# Patient Record
Sex: FEMALE | Race: WHITE | NOT HISPANIC OR LATINO
[De-identification: names, ages, dates, MRNs, and addresses within clinical notes are randomized per-mention and may not be internally consistent; named-entity substitution may affect disease eponyms.]

---

## 2018-10-23 ENCOUNTER — TRANSCRIPTION ENCOUNTER (OUTPATIENT)
Age: 33
End: 2018-10-23

## 2018-11-05 ENCOUNTER — APPOINTMENT (OUTPATIENT)
Dept: DERMATOLOGY | Facility: CLINIC | Age: 33
End: 2018-11-05

## 2019-08-05 ENCOUNTER — RESULT REVIEW (OUTPATIENT)
Age: 34
End: 2019-08-05

## 2020-04-30 ENCOUNTER — TRANSCRIPTION ENCOUNTER (OUTPATIENT)
Age: 35
End: 2020-04-30

## 2020-11-24 ENCOUNTER — INPATIENT (INPATIENT)
Facility: HOSPITAL | Age: 35
LOS: 3 days | Discharge: HOME | End: 2020-11-28
Attending: HOSPITALIST | Admitting: HOSPITALIST
Payer: COMMERCIAL

## 2020-11-24 VITALS
HEIGHT: 62 IN | SYSTOLIC BLOOD PRESSURE: 98 MMHG | OXYGEN SATURATION: 100 % | DIASTOLIC BLOOD PRESSURE: 65 MMHG | TEMPERATURE: 97 F | HEART RATE: 120 BPM | RESPIRATION RATE: 16 BRPM

## 2020-11-24 LAB
ALBUMIN SERPL ELPH-MCNC: 5 G/DL — SIGNIFICANT CHANGE UP (ref 3.3–5)
ALP SERPL-CCNC: 73 U/L — SIGNIFICANT CHANGE UP (ref 40–120)
ALT FLD-CCNC: 10 U/L — SIGNIFICANT CHANGE UP (ref 4–33)
ANION GAP SERPL CALC-SCNC: 18 MMO/L — HIGH (ref 7–14)
ANISOCYTOSIS BLD QL: SLIGHT — SIGNIFICANT CHANGE UP
APPEARANCE UR: CLEAR — SIGNIFICANT CHANGE UP
AST SERPL-CCNC: 17 U/L — SIGNIFICANT CHANGE UP (ref 4–32)
BACTERIA # UR AUTO: SIGNIFICANT CHANGE UP
BASE EXCESS BLDV CALC-SCNC: -3.4 MMOL/L — SIGNIFICANT CHANGE UP
BASOPHILS # BLD AUTO: 0.09 K/UL — SIGNIFICANT CHANGE UP (ref 0–0.2)
BASOPHILS NFR BLD AUTO: 0.3 % — SIGNIFICANT CHANGE UP (ref 0–2)
BASOPHILS NFR SPEC: 0 % — SIGNIFICANT CHANGE UP (ref 0–2)
BILIRUB SERPL-MCNC: 0.5 MG/DL — SIGNIFICANT CHANGE UP (ref 0.2–1.2)
BILIRUB UR-MCNC: NEGATIVE — SIGNIFICANT CHANGE UP
BLOOD GAS VENOUS - CREATININE: 0.61 MG/DL — SIGNIFICANT CHANGE UP (ref 0.5–1.3)
BLOOD GAS VENOUS - FIO2: 21 — SIGNIFICANT CHANGE UP
BLOOD UR QL VISUAL: HIGH
BUN SERPL-MCNC: 9 MG/DL — SIGNIFICANT CHANGE UP (ref 7–23)
CALCIUM SERPL-MCNC: 10.2 MG/DL — SIGNIFICANT CHANGE UP (ref 8.4–10.5)
CHLORIDE BLDV-SCNC: 103 MMOL/L — SIGNIFICANT CHANGE UP (ref 96–108)
CHLORIDE SERPL-SCNC: 94 MMOL/L — LOW (ref 98–107)
CO2 SERPL-SCNC: 21 MMOL/L — LOW (ref 22–31)
COLOR SPEC: SIGNIFICANT CHANGE UP
CREAT SERPL-MCNC: 0.57 MG/DL — SIGNIFICANT CHANGE UP (ref 0.5–1.3)
EOSINOPHIL # BLD AUTO: 0.03 K/UL — SIGNIFICANT CHANGE UP (ref 0–0.5)
EOSINOPHIL NFR BLD AUTO: 0.1 % — SIGNIFICANT CHANGE UP (ref 0–6)
EOSINOPHIL NFR FLD: 0 % — SIGNIFICANT CHANGE UP (ref 0–6)
GAS PNL BLDV: 131 MMOL/L — LOW (ref 136–146)
GLUCOSE BLDV-MCNC: 98 MG/DL — SIGNIFICANT CHANGE UP (ref 70–99)
GLUCOSE SERPL-MCNC: 112 MG/DL — HIGH (ref 70–99)
GLUCOSE UR-MCNC: NEGATIVE — SIGNIFICANT CHANGE UP
HCG SERPL-ACNC: < 5 MIU/ML — SIGNIFICANT CHANGE UP
HCO3 BLDV-SCNC: 21 MMOL/L — SIGNIFICANT CHANGE UP (ref 20–27)
HCT VFR BLD CALC: 45.9 % — HIGH (ref 34.5–45)
HCT VFR BLDV CALC: 43.6 % — SIGNIFICANT CHANGE UP (ref 34.5–45)
HGB BLD-MCNC: 14.9 G/DL — SIGNIFICANT CHANGE UP (ref 11.5–15.5)
HGB BLDV-MCNC: 14.2 G/DL — SIGNIFICANT CHANGE UP (ref 11.5–15.5)
HYALINE CASTS # UR AUTO: NEGATIVE — SIGNIFICANT CHANGE UP
IMM GRANULOCYTES NFR BLD AUTO: 0.6 % — SIGNIFICANT CHANGE UP (ref 0–1.5)
KETONES UR-MCNC: HIGH
LACTATE BLDV-MCNC: 1.8 MMOL/L — SIGNIFICANT CHANGE UP (ref 0.5–2)
LEUKOCYTE ESTERASE UR-ACNC: NEGATIVE — SIGNIFICANT CHANGE UP
LYMPHOCYTES # BLD AUTO: 2.43 K/UL — SIGNIFICANT CHANGE UP (ref 1–3.3)
LYMPHOCYTES # BLD AUTO: 9 % — LOW (ref 13–44)
LYMPHOCYTES NFR SPEC AUTO: 5.2 % — LOW (ref 13–44)
MAGNESIUM SERPL-MCNC: 1.8 MG/DL — SIGNIFICANT CHANGE UP (ref 1.6–2.6)
MCHC RBC-ENTMCNC: 30.2 PG — SIGNIFICANT CHANGE UP (ref 27–34)
MCHC RBC-ENTMCNC: 32.5 % — SIGNIFICANT CHANGE UP (ref 32–36)
MCV RBC AUTO: 92.9 FL — SIGNIFICANT CHANGE UP (ref 80–100)
MONOCYTES # BLD AUTO: 1.65 K/UL — HIGH (ref 0–0.9)
MONOCYTES NFR BLD AUTO: 6.1 % — SIGNIFICANT CHANGE UP (ref 2–14)
MONOCYTES NFR BLD: 5.2 % — SIGNIFICANT CHANGE UP (ref 2–9)
NEUTROPHIL AB SER-ACNC: 88.7 % — HIGH (ref 43–77)
NEUTROPHILS # BLD AUTO: 22.57 K/UL — HIGH (ref 1.8–7.4)
NEUTROPHILS NFR BLD AUTO: 83.9 % — HIGH (ref 43–77)
NITRITE UR-MCNC: NEGATIVE — SIGNIFICANT CHANGE UP
NRBC # FLD: 0 K/UL — SIGNIFICANT CHANGE UP (ref 0–0)
PCO2 BLDV: 39 MMHG — LOW (ref 41–51)
PH BLDV: 7.36 PH — SIGNIFICANT CHANGE UP (ref 7.32–7.43)
PH UR: 6.5 — SIGNIFICANT CHANGE UP (ref 5–8)
PHOSPHATE SERPL-MCNC: 3.1 MG/DL — SIGNIFICANT CHANGE UP (ref 2.5–4.5)
PLATELET # BLD AUTO: 654 K/UL — HIGH (ref 150–400)
PLATELET COUNT - ESTIMATE: SIGNIFICANT CHANGE UP
PMV BLD: 9.3 FL — SIGNIFICANT CHANGE UP (ref 7–13)
PO2 BLDV: 42 MMHG — HIGH (ref 35–40)
POLYCHROMASIA BLD QL SMEAR: SLIGHT — SIGNIFICANT CHANGE UP
POTASSIUM BLDV-SCNC: 5.2 MMOL/L — HIGH (ref 3.4–4.5)
POTASSIUM SERPL-MCNC: 3.7 MMOL/L — SIGNIFICANT CHANGE UP (ref 3.5–5.3)
POTASSIUM SERPL-SCNC: 3.7 MMOL/L — SIGNIFICANT CHANGE UP (ref 3.5–5.3)
PROT SERPL-MCNC: 8.6 G/DL — HIGH (ref 6–8.3)
PROT UR-MCNC: 10 — SIGNIFICANT CHANGE UP
RBC # BLD: 4.94 M/UL — SIGNIFICANT CHANGE UP (ref 3.8–5.2)
RBC # FLD: 13.4 % — SIGNIFICANT CHANGE UP (ref 10.3–14.5)
RBC CASTS # UR COMP ASSIST: HIGH (ref 0–?)
SAO2 % BLDV: 69.8 % — SIGNIFICANT CHANGE UP (ref 60–85)
SODIUM SERPL-SCNC: 133 MMOL/L — LOW (ref 135–145)
SP GR SPEC: > 1.04 — HIGH (ref 1–1.04)
SQUAMOUS # UR AUTO: SIGNIFICANT CHANGE UP
UROBILINOGEN FLD QL: NORMAL — SIGNIFICANT CHANGE UP
VARIANT LYMPHS # BLD: 0.9 % — SIGNIFICANT CHANGE UP
WBC # BLD: 26.92 K/UL — HIGH (ref 3.8–10.5)
WBC # FLD AUTO: 26.92 K/UL — HIGH (ref 3.8–10.5)
WBC UR QL: SIGNIFICANT CHANGE UP (ref 0–?)

## 2020-11-24 PROCEDURE — 74177 CT ABD & PELVIS W/CONTRAST: CPT | Mod: 26

## 2020-11-24 RX ORDER — SODIUM CHLORIDE 9 MG/ML
1000 INJECTION INTRAMUSCULAR; INTRAVENOUS; SUBCUTANEOUS ONCE
Refills: 0 | Status: DISCONTINUED | OUTPATIENT
Start: 2020-11-24 | End: 2020-11-24

## 2020-11-24 RX ORDER — ONDANSETRON 8 MG/1
4 TABLET, FILM COATED ORAL ONCE
Refills: 0 | Status: COMPLETED | OUTPATIENT
Start: 2020-11-24 | End: 2020-11-24

## 2020-11-24 RX ORDER — SODIUM CHLORIDE 9 MG/ML
2000 INJECTION INTRAMUSCULAR; INTRAVENOUS; SUBCUTANEOUS ONCE
Refills: 0 | Status: COMPLETED | OUTPATIENT
Start: 2020-11-24 | End: 2020-11-24

## 2020-11-24 RX ORDER — FAMOTIDINE 10 MG/ML
20 INJECTION INTRAVENOUS ONCE
Refills: 0 | Status: COMPLETED | OUTPATIENT
Start: 2020-11-24 | End: 2020-11-24

## 2020-11-24 RX ORDER — PIPERACILLIN AND TAZOBACTAM 4; .5 G/20ML; G/20ML
3.38 INJECTION, POWDER, LYOPHILIZED, FOR SOLUTION INTRAVENOUS ONCE
Refills: 0 | Status: COMPLETED | OUTPATIENT
Start: 2020-11-24 | End: 2020-11-24

## 2020-11-24 RX ORDER — MORPHINE SULFATE 50 MG/1
2 CAPSULE, EXTENDED RELEASE ORAL ONCE
Refills: 0 | Status: DISCONTINUED | OUTPATIENT
Start: 2020-11-24 | End: 2020-11-24

## 2020-11-24 RX ADMIN — MORPHINE SULFATE 2 MILLIGRAM(S): 50 CAPSULE, EXTENDED RELEASE ORAL at 21:46

## 2020-11-24 RX ADMIN — PIPERACILLIN AND TAZOBACTAM 200 GRAM(S): 4; .5 INJECTION, POWDER, LYOPHILIZED, FOR SOLUTION INTRAVENOUS at 23:33

## 2020-11-24 RX ADMIN — MORPHINE SULFATE 2 MILLIGRAM(S): 50 CAPSULE, EXTENDED RELEASE ORAL at 23:00

## 2020-11-24 RX ADMIN — ONDANSETRON 4 MILLIGRAM(S): 8 TABLET, FILM COATED ORAL at 21:46

## 2020-11-24 RX ADMIN — FAMOTIDINE 20 MILLIGRAM(S): 10 INJECTION INTRAVENOUS at 21:46

## 2020-11-24 RX ADMIN — SODIUM CHLORIDE 2000 MILLILITER(S): 9 INJECTION INTRAMUSCULAR; INTRAVENOUS; SUBCUTANEOUS at 21:51

## 2020-11-24 NOTE — ED PROVIDER NOTE - OBJECTIVE STATEMENT
35F PMH endometriosis presents with complaint of abdominal pain. The patient reports lower abdominal pain localized to the right-side that began on Saturday. It is described as a stabbing right-sided pain that travels to the right flank area. The pain has no alleviating or exacerbating factors. It is not responsive to meloxicam. She has also been experiencing diarrhea. Stools are light-colored and watery. She also experiences nausea. She denies new medications such as antibiotics and only takes safyral for endometriosis. She reports a history of appendectomy and abdominal surgeries for endometriosis. She denies being sexually active, history of STDs, new foods, recent travel, sick contacts, fever, chills, chest pain, SOB, vomiting, or urinary symptoms. 35F PMH endometriosis and thrombocytosis presents with complaint of abdominal pain. The patient reports lower abdominal pain localized to the right-side that began on Saturday. It is described as a stabbing right-sided pain that travels to the right flank area. The pain has no alleviating or exacerbating factors. It is not responsive to meloxicam. She has also been experiencing diarrhea. Stools are light-colored and watery. She also experiences nausea. She denies new medications such as antibiotics and only takes safyral for endometriosis. She reports a history of appendectomy and abdominal surgeries for endometriosis. She denies being sexually active, history of STDs, new foods, recent travel, sick contacts, fever, chills, chest pain, SOB, vomiting, or urinary symptoms. Patient with history of thrombocytosis and sees a hematologist.

## 2020-11-24 NOTE — ED PROVIDER NOTE - PROGRESS NOTE DETAILS
pt pending imaging, given IV abx for wbc. pt more comfortable. endorse to night team Consulted GYN per Hospitalist's request. GYN will see the patient.

## 2020-11-24 NOTE — ED ADULT NURSE NOTE - NSIMPLEMENTINTERV_GEN_ALL_ED
Implemented All Universal Safety Interventions:  [place] to call system. Call bell, personal items and telephone within reach. Instruct patient to call for assistance. Room bathroom lighting operational. Non-slip footwear when patient is off stretcher. Physically safe environment: no spills, clutter or unnecessary equipment. Stretcher in lowest position, wheels locked, appropriate side rails in place.

## 2020-11-24 NOTE — ED PROVIDER NOTE - CLINICAL SUMMARY MEDICAL DECISION MAKING FREE TEXT BOX
35F PMH endometriosis and thrombocytosis presents with complaint of abdominal pain. Patient with right-sided abdominal pain that radiates to right flank. Will rule out GYN vs pyelonephritis. Patient with history of appendectomy, so appendicitis less likely. Will order CBC, CMP, UA, CT abdomen and pelvis with IV contrast. Cervical motion tenderness and vaginal discharge on pelvic examination, will order transvaginal ultrasound. Patient with reduced po intake and diarrhea without new medications. Will give 1L NS bolus.

## 2020-11-24 NOTE — ED PROVIDER NOTE - ATTENDING CONTRIBUTION TO CARE
Attending Statement: I have personally seen and examined this patient. I have fully participated in the care of this patient. I have reviewed all pertinent clinical information, including history physical exam, plan and the Resident's note and agree except as noted  36yo F hx of endometriosis pw abdominal pain  x 3 days. Mid non radiating constant abdominal pain. Associated with nausea, decrease po intake and lose nonbloody diarrhea. Cramping pain when have a BM. no fever/chills. no dysuria no cp or sob. Denies preg. no travel . Pt endorse 4 prior surgeries for endometriosis   Vital signs noted. nonicteric, no juandice.  regular. no work of breathing. soft tender mid abdomen by umbilicus no guarding or rebound. no cvat. pelvic dw resident   plan labs, urine, ct abdomen-pelvis, IVF, pain med, rectal temp, re assess

## 2020-11-24 NOTE — ED ADULT NURSE NOTE - OBJECTIVE STATEMENT
Received PT to room 25, c/o right lower abdominal pain radiating to right lower back and non bloody diarrhea x1day. PT presents well appearing at baseline, ambulatory. Denies chest pain, SOB, vomit, dysuria. Right 20g AC placed, labs drawn, bed in lowest position, awaiting MD orders, will continue to monitor.

## 2020-11-24 NOTE — ED PROVIDER NOTE - PHYSICAL EXAMINATION
General: NAD  Head: Normocephalic, Atraumatic  Eyes: PERRLA, EOMI, normal sclera  Throat: Moist mucous membranes  Respiratory: CTAB, normal respiratory effort, no wheezes, crackles, rales  CV: RRR, S1/S2, no murmurs, rubs or gallops  Abdominal: Soft, bowel sounds present, right-sided tenderness to palpation, voluntary guarding, no flank tenderness to palpation, ND  Extremities: No edema, 2+ DP pulses  Neurological: A&Ox4, MAEx4, non-focal  Skin: No rashes General: NAD  Head: Normocephalic, Atraumatic  Eyes: PERRLA, EOMI, normal sclera  Throat: Moist mucous membranes  Respiratory: CTAB, normal respiratory effort, no wheezes, crackles, rales  CV: RRR, S1/S2, no murmurs, rubs or gallops  Abdominal: Soft, bowel sounds present, right-sided tenderness to palpation, voluntary guarding, no flank tenderness to palpation, ND  : white cervical discharge, tenderness during bimanual examination   Extremities: No edema, 2+ DP pulses  Neurological: A&Ox4, MAEx4, non-focal  Skin: No rashes General: NAD  Head: Normocephalic, Atraumatic  Eyes: PERRLA, EOMI, normal sclera  Throat: Moist mucous membranes  Respiratory: CTAB, normal respiratory effort, no wheezes, crackles, rales  CV: RRR, S1/S2, no murmurs, rubs or gallops  Abdominal: Soft, bowel sounds present, right-sided tenderness to palpation, voluntary guarding, no flank tenderness to palpation, ND  : white cervical discharge, cervical tenderness and RLQ pain during bimanual examination   Extremities: No edema, 2+ DP pulses  Neurological: A&Ox4, MAEx4, non-focal  Skin: No rashes

## 2020-11-25 DIAGNOSIS — R10.9 UNSPECIFIED ABDOMINAL PAIN: ICD-10-CM

## 2020-11-25 DIAGNOSIS — K52.9 NONINFECTIVE GASTROENTERITIS AND COLITIS, UNSPECIFIED: ICD-10-CM

## 2020-11-25 DIAGNOSIS — N93.9 ABNORMAL UTERINE AND VAGINAL BLEEDING, UNSPECIFIED: ICD-10-CM

## 2020-11-25 DIAGNOSIS — A41.9 SEPSIS, UNSPECIFIED ORGANISM: ICD-10-CM

## 2020-11-25 LAB
ANION GAP SERPL CALC-SCNC: 11 MMO/L — SIGNIFICANT CHANGE UP (ref 7–14)
BASOPHILS # BLD AUTO: 0.1 K/UL — SIGNIFICANT CHANGE UP (ref 0–0.2)
BASOPHILS NFR BLD AUTO: 0.5 % — SIGNIFICANT CHANGE UP (ref 0–2)
BUN SERPL-MCNC: 6 MG/DL — LOW (ref 7–23)
CALCIUM SERPL-MCNC: 8.5 MG/DL — SIGNIFICANT CHANGE UP (ref 8.4–10.5)
CHLORIDE SERPL-SCNC: 99 MMOL/L — SIGNIFICANT CHANGE UP (ref 98–107)
CO2 SERPL-SCNC: 23 MMOL/L — SIGNIFICANT CHANGE UP (ref 22–31)
CREAT SERPL-MCNC: 0.51 MG/DL — SIGNIFICANT CHANGE UP (ref 0.5–1.3)
EOSINOPHIL # BLD AUTO: 0.13 K/UL — SIGNIFICANT CHANGE UP (ref 0–0.5)
EOSINOPHIL NFR BLD AUTO: 0.6 % — SIGNIFICANT CHANGE UP (ref 0–6)
GLUCOSE SERPL-MCNC: 82 MG/DL — SIGNIFICANT CHANGE UP (ref 70–99)
HCT VFR BLD CALC: 37.2 % — SIGNIFICANT CHANGE UP (ref 34.5–45)
HGB BLD-MCNC: 12.3 G/DL — SIGNIFICANT CHANGE UP (ref 11.5–15.5)
IMM GRANULOCYTES NFR BLD AUTO: 0.7 % — SIGNIFICANT CHANGE UP (ref 0–1.5)
LYMPHOCYTES # BLD AUTO: 11 % — LOW (ref 13–44)
LYMPHOCYTES # BLD AUTO: 2.43 K/UL — SIGNIFICANT CHANGE UP (ref 1–3.3)
MAGNESIUM SERPL-MCNC: 1.8 MG/DL — SIGNIFICANT CHANGE UP (ref 1.6–2.6)
MCHC RBC-ENTMCNC: 30.8 PG — SIGNIFICANT CHANGE UP (ref 27–34)
MCHC RBC-ENTMCNC: 33.1 % — SIGNIFICANT CHANGE UP (ref 32–36)
MCV RBC AUTO: 93 FL — SIGNIFICANT CHANGE UP (ref 80–100)
MONOCYTES # BLD AUTO: 1.73 K/UL — HIGH (ref 0–0.9)
MONOCYTES NFR BLD AUTO: 7.8 % — SIGNIFICANT CHANGE UP (ref 2–14)
NEUTROPHILS # BLD AUTO: 17.57 K/UL — HIGH (ref 1.8–7.4)
NEUTROPHILS NFR BLD AUTO: 79.4 % — HIGH (ref 43–77)
NRBC # FLD: 0 K/UL — SIGNIFICANT CHANGE UP (ref 0–0)
PHOSPHATE SERPL-MCNC: 2.4 MG/DL — LOW (ref 2.5–4.5)
PLATELET # BLD AUTO: 536 K/UL — HIGH (ref 150–400)
PMV BLD: 9.1 FL — SIGNIFICANT CHANGE UP (ref 7–13)
POTASSIUM SERPL-MCNC: 3.6 MMOL/L — SIGNIFICANT CHANGE UP (ref 3.5–5.3)
POTASSIUM SERPL-SCNC: 3.6 MMOL/L — SIGNIFICANT CHANGE UP (ref 3.5–5.3)
RBC # BLD: 4 M/UL — SIGNIFICANT CHANGE UP (ref 3.8–5.2)
RBC # FLD: 13.4 % — SIGNIFICANT CHANGE UP (ref 10.3–14.5)
SARS-COV-2 RNA SPEC QL NAA+PROBE: SIGNIFICANT CHANGE UP
SODIUM SERPL-SCNC: 133 MMOL/L — LOW (ref 135–145)
WBC # BLD: 22.12 K/UL — HIGH (ref 3.8–10.5)
WBC # FLD AUTO: 22.12 K/UL — HIGH (ref 3.8–10.5)

## 2020-11-25 PROCEDURE — 12345: CPT | Mod: NC

## 2020-11-25 PROCEDURE — 99284 EMERGENCY DEPT VISIT MOD MDM: CPT

## 2020-11-25 PROCEDURE — 76830 TRANSVAGINAL US NON-OB: CPT | Mod: 26

## 2020-11-25 PROCEDURE — 93010 ELECTROCARDIOGRAM REPORT: CPT

## 2020-11-25 PROCEDURE — 99223 1ST HOSP IP/OBS HIGH 75: CPT

## 2020-11-25 RX ORDER — POLYETHYLENE GLYCOL 3350 17 G/17G
17 POWDER, FOR SOLUTION ORAL ONCE
Refills: 0 | Status: COMPLETED | OUTPATIENT
Start: 2020-11-25 | End: 2020-11-25

## 2020-11-25 RX ORDER — ONDANSETRON 8 MG/1
4 TABLET, FILM COATED ORAL ONCE
Refills: 0 | Status: COMPLETED | OUTPATIENT
Start: 2020-11-25 | End: 2020-11-25

## 2020-11-25 RX ORDER — DROSPIRENONE/ETHINYL ESTRADIOL/LEVOMEFOLATE CALCIUM AND LEVOMEFOLATE CALCIUM 3-0.03(21)
1 KIT ORAL
Qty: 0 | Refills: 0 | DISCHARGE

## 2020-11-25 RX ORDER — SODIUM,POTASSIUM PHOSPHATES 278-250MG
1 POWDER IN PACKET (EA) ORAL ONCE
Refills: 0 | Status: COMPLETED | OUTPATIENT
Start: 2020-11-25 | End: 2020-11-25

## 2020-11-25 RX ORDER — MORPHINE SULFATE 50 MG/1
2 CAPSULE, EXTENDED RELEASE ORAL EVERY 4 HOURS
Refills: 0 | Status: DISCONTINUED | OUTPATIENT
Start: 2020-11-25 | End: 2020-11-26

## 2020-11-25 RX ORDER — SODIUM CHLORIDE 9 MG/ML
1000 INJECTION INTRAMUSCULAR; INTRAVENOUS; SUBCUTANEOUS
Refills: 0 | Status: DISCONTINUED | OUTPATIENT
Start: 2020-11-25 | End: 2020-11-26

## 2020-11-25 RX ORDER — MORPHINE SULFATE 50 MG/1
2 CAPSULE, EXTENDED RELEASE ORAL ONCE
Refills: 0 | Status: DISCONTINUED | OUTPATIENT
Start: 2020-11-25 | End: 2020-11-25

## 2020-11-25 RX ORDER — ONDANSETRON 8 MG/1
4 TABLET, FILM COATED ORAL EVERY 6 HOURS
Refills: 0 | Status: DISCONTINUED | OUTPATIENT
Start: 2020-11-25 | End: 2020-11-28

## 2020-11-25 RX ORDER — CIPROFLOXACIN LACTATE 400MG/40ML
400 VIAL (ML) INTRAVENOUS EVERY 12 HOURS
Refills: 0 | Status: DISCONTINUED | OUTPATIENT
Start: 2020-11-25 | End: 2020-11-26

## 2020-11-25 RX ORDER — METRONIDAZOLE 500 MG
500 TABLET ORAL EVERY 8 HOURS
Refills: 0 | Status: DISCONTINUED | OUTPATIENT
Start: 2020-11-25 | End: 2020-11-26

## 2020-11-25 RX ADMIN — Medication 1 TABLET(S): at 13:14

## 2020-11-25 RX ADMIN — Medication 100 MILLIGRAM(S): at 06:32

## 2020-11-25 RX ADMIN — MORPHINE SULFATE 2 MILLIGRAM(S): 50 CAPSULE, EXTENDED RELEASE ORAL at 20:53

## 2020-11-25 RX ADMIN — Medication 100 MILLIGRAM(S): at 21:11

## 2020-11-25 RX ADMIN — MORPHINE SULFATE 2 MILLIGRAM(S): 50 CAPSULE, EXTENDED RELEASE ORAL at 14:31

## 2020-11-25 RX ADMIN — Medication 200 MILLIGRAM(S): at 05:09

## 2020-11-25 RX ADMIN — MORPHINE SULFATE 2 MILLIGRAM(S): 50 CAPSULE, EXTENDED RELEASE ORAL at 04:20

## 2020-11-25 RX ADMIN — MORPHINE SULFATE 2 MILLIGRAM(S): 50 CAPSULE, EXTENDED RELEASE ORAL at 10:05

## 2020-11-25 RX ADMIN — MORPHINE SULFATE 2 MILLIGRAM(S): 50 CAPSULE, EXTENDED RELEASE ORAL at 21:08

## 2020-11-25 RX ADMIN — Medication 100 MILLIGRAM(S): at 13:14

## 2020-11-25 RX ADMIN — MORPHINE SULFATE 2 MILLIGRAM(S): 50 CAPSULE, EXTENDED RELEASE ORAL at 14:50

## 2020-11-25 RX ADMIN — SODIUM CHLORIDE 100 MILLILITER(S): 9 INJECTION INTRAMUSCULAR; INTRAVENOUS; SUBCUTANEOUS at 06:21

## 2020-11-25 RX ADMIN — ONDANSETRON 4 MILLIGRAM(S): 8 TABLET, FILM COATED ORAL at 10:05

## 2020-11-25 RX ADMIN — SODIUM CHLORIDE 100 MILLILITER(S): 9 INJECTION INTRAMUSCULAR; INTRAVENOUS; SUBCUTANEOUS at 16:57

## 2020-11-25 RX ADMIN — POLYETHYLENE GLYCOL 3350 17 GRAM(S): 17 POWDER, FOR SOLUTION ORAL at 16:57

## 2020-11-25 RX ADMIN — MORPHINE SULFATE 2 MILLIGRAM(S): 50 CAPSULE, EXTENDED RELEASE ORAL at 10:35

## 2020-11-25 RX ADMIN — Medication 200 MILLIGRAM(S): at 16:57

## 2020-11-25 RX ADMIN — ONDANSETRON 4 MILLIGRAM(S): 8 TABLET, FILM COATED ORAL at 21:11

## 2020-11-25 NOTE — H&P ADULT - HISTORY OF PRESENT ILLNESS
Patient is a 36 y/o F PMH endometriosis, thrombocytosis p/w stabbing, RLQ abdominal pain w/ radiation to R flank area since 11/21 w/ associated nausea, anorexia, decreased PO intake, and non-bloody diarrhea (>5 episodes per day). Reports pain is intermittent, severe, no alleviating/aggravating factors.  No recent antibiotic use, no change in medications, change in diet, travel, sick contacts.  Last colonoscopy >5 years ago as routine testing prior to endometriosis surgery.

## 2020-11-25 NOTE — H&P ADULT - NSICDXPASTMEDICALHX_GEN_ALL_CORE_FT
PAST MEDICAL HISTORY:  Low Muscle Tone     Migraine     Ovarian Cyst right     Seasonal Allergies

## 2020-11-25 NOTE — ED ADULT NURSE REASSESSMENT NOTE - NS ED NURSE REASSESS COMMENT FT1
Received report from MARIO [place]. Pt resting comfortably at this time. Resp. even and unlabored. Denies any complaints at this time. VS as noted. NAD. Will continue to monitor.

## 2020-11-25 NOTE — ED ADULT NURSE REASSESSMENT NOTE - NS ED NURSE REASSESS COMMENT FT1
Report given to MARIO Pichardo in [facility]. Resp. even and unlabored. Denies CP, SOB, HA, and dizziness. NAD. Transported to [facility].

## 2020-11-25 NOTE — H&P ADULT - NSHPPHYSICALEXAM_GEN_ALL_CORE
T(C): 36.8 (11-25-20 @ 04:22), Max: 37.3 (11-24-20 @ 22:50)  HR: 110 (11-25-20 @ 04:22) (96 - 125)  BP: 133/76 (11-25-20 @ 04:22) (98/65 - 139/80)  RR: 20 (11-25-20 @ 04:22) (16 - 20)  SpO2: 100% (11-25-20 @ 04:22) (100% - 100%)    Constitutional: NAD, well-developed, well-nourished  Ears, Nose, Mouth, and Throat: normal external ears and nose, normal hearing, moist oral mucosa  Eyes: normal conjunctiva, EOMI, PERRL  Neck: supple, no JVD  Respiratory: Clear to auscultation bilaterally. No wheezes, rales or rhonchi. Normal respiratory effort  Cardiovascular: RRR, no M/R/G, no edema, 2+ Peripheral Pulses  Gastrointestinal: soft, +TTP, no rigidity, rebound, nondistended, +BS, no hernia  Skin: warm, dry, no rash  Neurologic: sensation grossly intact, CN grossly intact, non-focal exam  Musculoskeletal: no clubbing, no cyanosis, no joint swelling  Psychiatric: AOX3, appropriate mood, affect

## 2020-11-25 NOTE — CHART NOTE - NSCHARTNOTEFT_GEN_A_CORE
R3 Chart Note    34y/o G0, LMP beginning of November, h/o endometriosis, presented to the ED overnight complaining of RLQ abdominal pain radiating to the right flank x3 days, associated with nausea, poor appetite, and multiple episodes of non-bloody diarrhea. GYN called because the patient requested to speak with someone from the team due to the ultrasound findings of 2 small simple ovarian cysts. The patient has no GYN complaints currently. She still has right flank pain, but it is well controlled with medication. She has some spotting since the pelvic exam that was performed in the ED yesterday.     Private GYN: Dr. Garcia ([facility])    OBH - G0  GYN - endometriosis status post LSC excision and fulguration, h/o ovarian cysts status post ovarian cystectomy x2,   PM/SH  - R3 GYN Chart Note    36y/o G0, LMP beginning of November, h/o endometriosis, presented to the ED overnight complaining of RLQ abdominal pain radiating to the right flank x3 days, associated with nausea, poor appetite, and multiple episodes of non-bloody diarrhea. GYN called because the patient requested to speak with someone from the team due to the ultrasound findings of 2 small simple ovarian cysts. The patient has no GYN complaints currently. She still has right flank pain, but it is well controlled with medication. She has some spotting since the pelvic exam that was performed in the ED yesterday.     Private GYN: Dr. Garcia ([facility])    OBH - G0  GYN - never been sexually active, endometriosis status post LSC excision and fulguration, h/o ovarian cysts status post ovarian cystectomy x2, h/o TOA  PM/SH  - low muscle tone, thrombocytosis, migraines  Meds - OCPs  All - NKDA    EXAM:  US TRANSVAGINAL    PROCEDURE DATE:  Nov 25 2020   INTERPRETATION:  CLINICAL INFORMATION: Pelvic pain, leukocytosis. History of endometriosis.    LMP: Few weeks ago.  COMPARISON: CT abdomen/pelvis from 11/24/2020.  TECHNIQUE:  Endovaginal pelvic sonogram only. Color and Spectral Doppler was performed.    FINDINGS:  Uterus is retroverted: 6.8 x 4.0 x 5.2 cm. 1.2 x 0.6 x 1.1 cm fibroid.  Endometrium: 5 mm.Trace fluid within the endometrial canal.  Right ovary: 1.0 x 1.9 x 1.7 cm cm. Normal arterial waveforms. 0.8 x 1.0 x 0.8 cm right ovarian simple cyst.  Left ovary: 3.4 x 2.5 x 2.6 cm.  Normal arterial waveforms. 2.6 x 2.1 x 2.2 cm left ovarian simple cyst.    Fluid: Trace free fluid around the left adnexa.    IMPRESSION:  1.2 cm uterine fibroid. Trace fluid within endometrial canal.  Small bilateral ovarian cysts.  Trace free fluid around the left adnexa.    PLAN:   -As requested by the patient, discussed the ultrasound with the patient's private Gynecologist  -Patient can calll her private GYN's office next week to provide an update'  -Defer management of colitis to the primary team    D/w Dr. Jose Philippe PGY-3

## 2020-11-25 NOTE — H&P ADULT - NSHPLABSRESULTS_GEN_ALL_CORE
11-24    133<L>  |  94<L>  |  9   ----------------------------<  112<H>  3.7   |  21<L>  |  0.57    Ca    10.2      24 Nov 2020 21:34  Phos  3.1     11-24  Mg     1.8     11-24    TPro  8.6<H>  /  Alb  5.0  /  TBili  0.5  /  DBili  x   /  AST  17  /  ALT  10  /  AlkPhos  73  11-24                        14.9   26.92 )-----------( 654      ( 24 Nov 2020 21:34 )             45.9     LIVER FUNCTIONS - ( 24 Nov 2020 21:34 )  Alb: 5.0 g/dL / Pro: 8.6 g/dL / ALK PHOS: 73 u/L / ALT: 10 u/L / AST: 17 u/L / GGT: x           Urinalysis Basic - ( 24 Nov 2020 23:22 )    Color: LIGHT YELLOW / Appearance: CLEAR / SG: > 1.040 / pH: 6.5  Gluc: NEGATIVE / Ketone: MODERATE  / Bili: NEGATIVE / Urobili: NORMAL   Blood: LARGE / Protein: 10 / Nitrite: NEGATIVE   Leuk Esterase: NEGATIVE / RBC: 26-50 / WBC 3-5   Sq Epi: FEW / Non Sq Epi: x / Bacteria: FEW

## 2020-11-25 NOTE — H&P ADULT - NSHPREVIEWOFSYSTEMS_GEN_ALL_CORE
Constitutional Symptoms: No weakness, fevers, chills, weight loss  Eyes: No visual changes, headache, eye pain, double vision  Ears, Nose, Mouth, Throat: No runny nose, sinus pain, ear pain, tinnitus, sore throat, dysphagia, odynophagia  Cardiovascular: No chest pain, palpitations, edema  Respiratory: No cough, wheezing, hemoptysis, shortness of breath  Gastrointestinal: +abdominal pain, anorexia, nausea, diarrhea, no vomiting  Genitourinary: No dysuria, frequency, hematuria  Musculoskeletal: No joint pain, joint swelling, decreased ROM  Skin: No pruritus, rashes, lesions, wounds  Neurologic:  No seizures, headache, paraesthesia, numbness, limb weakness    Positives and pertinent negatives noted and all other systems negative.

## 2020-11-25 NOTE — PROGRESS NOTE ADULT - SUBJECTIVE AND OBJECTIVE BOX
Patient is a 35y old  Female who presents with a chief complaint of diarrhea (25 Nov 2020 04:53)      SUBJECTIVE / OVERNIGHT EVENTS:    MEDICATIONS  (STANDING):  ciprofloxacin   IVPB 400 milliGRAM(s) IV Intermittent every 12 hours  metroNIDAZOLE  IVPB 500 milliGRAM(s) IV Intermittent every 8 hours  sodium chloride 0.9%. 1000 milliLiter(s) (100 mL/Hr) IV Continuous <Continuous>    MEDICATIONS  (PRN):  morphine  - Injectable 2 milliGRAM(s) IV Push every 4 hours PRN mod-severe pain  ondansetron Injectable 4 milliGRAM(s) IV Push every 6 hours PRN Nausea and/or Vomiting      Vital Signs Last 24 Hrs  T(C): 36.4 (25 Nov 2020 12:53), Max: 37.3 (24 Nov 2020 22:50)  T(F): 97.6 (25 Nov 2020 12:53), Max: 99.1 (24 Nov 2020 22:50)  HR: 91 (25 Nov 2020 14:31) (89 - 125)  BP: 128/70 (25 Nov 2020 14:31) (98/65 - 139/80)  BP(mean): --  RR: 18 (25 Nov 2020 12:53) (16 - 20)  SpO2: 100% (25 Nov 2020 12:53) (99% - 100%)  CAPILLARY BLOOD GLUCOSE        I&O's Summary      Constitutional: NAD, awake and alert  Neck: Soft and supple  Respiratory: no respiratory distress, Breath sounds are clear bilaterally. No wheezing, rales or rhonchi  Cardiovascular: S1 and S2, regular rate and rhythm, no murmurs. peripheral pulses palpable x4  Gastrointestinal: Soft, mild RLQ tenderness, nondistended. +BS  Extremities: No lower extremity edema, no calf tenderness, warm to touch  Musculoskeletal: Normal ROM, no joint swelling.  Skin: No rashes, No erythema   Psych: Alert and Oriented x3, normal mood, normal affect    LABS:                        12.3   22.12 )-----------( 536      ( 25 Nov 2020 08:00 )             37.2     11-25    133<L>  |  99  |  6<L>  ----------------------------<  82  3.6   |  23  |  0.51    Ca    8.5      25 Nov 2020 08:00  Phos  2.4     11-25  Mg     1.8     11-25    TPro  8.6<H>  /  Alb  5.0  /  TBili  0.5  /  DBili  x   /  AST  17  /  ALT  10  /  AlkPhos  73  11-24          Urinalysis Basic - ( 24 Nov 2020 23:22 )    Color: LIGHT YELLOW / Appearance: CLEAR / SG: > 1.040 / pH: 6.5  Gluc: NEGATIVE / Ketone: MODERATE  / Bili: NEGATIVE / Urobili: NORMAL   Blood: LARGE / Protein: 10 / Nitrite: NEGATIVE   Leuk Esterase: NEGATIVE / RBC: 26-50 / WBC 3-5   Sq Epi: FEW / Non Sq Epi: x / Bacteria: FEW        RADIOLOGY & ADDITIONAL TESTS:    Imaging Personally Reviewed:    Consultant(s) Notes Reviewed:      Care Discussed with Consultants/Other Providers: MALI Mosher

## 2020-11-25 NOTE — CHART NOTE - NSCHARTNOTEFT_GEN_A_CORE
Vital Signs Last 24 Hrs  T(C): 36.4 (25 Nov 2020 12:53), Max: 37.3 (24 Nov 2020 22:50)  T(F): 97.6 (25 Nov 2020 12:53), Max: 99.1 (24 Nov 2020 22:50)  HR: 89 (25 Nov 2020 12:53) (89 - 125)  BP: 123/73 (25 Nov 2020 12:53) (98/65 - 139/80)  BP(mean): --  RR: 18 (25 Nov 2020 12:53) (16 - 20)  SpO2: 100% (25 Nov 2020 12:53) (99% - 100%)                        12.3   22.12 )-----------( 536      ( 25 Nov 2020 08:00 )             37.2   11-25    133<L>  |  99  |  6<L>  ----------------------------<  82  3.6   |  23  |  0.51    Ca    8.5      25 Nov 2020 08:00  Phos  2.4     11-25  Mg     1.8     11-25    TPro  8.6<H>  /  Alb  5.0  /  TBili  0.5  /  DBili  x   /  AST  17  /  ALT  10  /  AlkPhos  73  11-24    35 F PMH endometriosis, thrombocytosis p/w sepsis 2/2 colitis on IV Cipro/Flagyl.  Stool studies pending, RN aware to obtain with next BM.  Patient reports vaginal bleeding, US transvaginal results noted and discussed with Dr. Cox- GYN consult called for further recs (spectra [id number])  Discussed plan of care with patient, RN and attending

## 2020-11-26 ENCOUNTER — TRANSCRIPTION ENCOUNTER (OUTPATIENT)
Age: 35
End: 2020-11-26

## 2020-11-26 DIAGNOSIS — B96.23 UNSPECIFIED SHIGA TOXIN-PRODUCING ESCHERICHIA COLI [E. COLI] [STEC] AS THE CAUSE OF DISEASES CLASSIFIED ELSEWHERE: ICD-10-CM

## 2020-11-26 DIAGNOSIS — D72.829 ELEVATED WHITE BLOOD CELL COUNT, UNSPECIFIED: ICD-10-CM

## 2020-11-26 DIAGNOSIS — R10.9 UNSPECIFIED ABDOMINAL PAIN: ICD-10-CM

## 2020-11-26 DIAGNOSIS — N80.9 ENDOMETRIOSIS, UNSPECIFIED: ICD-10-CM

## 2020-11-26 DIAGNOSIS — D47.3 ESSENTIAL (HEMORRHAGIC) THROMBOCYTHEMIA: ICD-10-CM

## 2020-11-26 DIAGNOSIS — E73.9 LACTOSE INTOLERANCE, UNSPECIFIED: ICD-10-CM

## 2020-11-26 LAB
ANION GAP SERPL CALC-SCNC: 11 MMO/L — SIGNIFICANT CHANGE UP (ref 7–14)
BASOPHILS # BLD AUTO: 0.08 K/UL — SIGNIFICANT CHANGE UP (ref 0–0.2)
BASOPHILS NFR BLD AUTO: 0.4 % — SIGNIFICANT CHANGE UP (ref 0–2)
BUN SERPL-MCNC: 4 MG/DL — LOW (ref 7–23)
CALCIUM SERPL-MCNC: 8.1 MG/DL — LOW (ref 8.4–10.5)
CHLORIDE SERPL-SCNC: 106 MMOL/L — SIGNIFICANT CHANGE UP (ref 98–107)
CO2 SERPL-SCNC: 20 MMOL/L — LOW (ref 22–31)
CREAT SERPL-MCNC: 0.49 MG/DL — LOW (ref 0.5–1.3)
CULTURE RESULTS: SIGNIFICANT CHANGE UP
CULTURE RESULTS: SIGNIFICANT CHANGE UP
EOSINOPHIL # BLD AUTO: 0.34 K/UL — SIGNIFICANT CHANGE UP (ref 0–0.5)
EOSINOPHIL NFR BLD AUTO: 1.8 % — SIGNIFICANT CHANGE UP (ref 0–6)
GLUCOSE SERPL-MCNC: 77 MG/DL — SIGNIFICANT CHANGE UP (ref 70–99)
HCT VFR BLD CALC: 32.7 % — LOW (ref 34.5–45)
HGB BLD-MCNC: 10.7 G/DL — LOW (ref 11.5–15.5)
IMM GRANULOCYTES NFR BLD AUTO: 0.6 % — SIGNIFICANT CHANGE UP (ref 0–1.5)
LYMPHOCYTES # BLD AUTO: 15.9 % — SIGNIFICANT CHANGE UP (ref 13–44)
LYMPHOCYTES # BLD AUTO: 3.02 K/UL — SIGNIFICANT CHANGE UP (ref 1–3.3)
MAGNESIUM SERPL-MCNC: 1.9 MG/DL — SIGNIFICANT CHANGE UP (ref 1.6–2.6)
MCHC RBC-ENTMCNC: 31 PG — SIGNIFICANT CHANGE UP (ref 27–34)
MCHC RBC-ENTMCNC: 32.7 % — SIGNIFICANT CHANGE UP (ref 32–36)
MCV RBC AUTO: 94.8 FL — SIGNIFICANT CHANGE UP (ref 80–100)
MONOCYTES # BLD AUTO: 1.53 K/UL — HIGH (ref 0–0.9)
MONOCYTES NFR BLD AUTO: 8 % — SIGNIFICANT CHANGE UP (ref 2–14)
NEUTROPHILS # BLD AUTO: 13.95 K/UL — HIGH (ref 1.8–7.4)
NEUTROPHILS NFR BLD AUTO: 73.3 % — SIGNIFICANT CHANGE UP (ref 43–77)
NRBC # FLD: 0 K/UL — SIGNIFICANT CHANGE UP (ref 0–0)
PHOSPHATE SERPL-MCNC: 2.4 MG/DL — LOW (ref 2.5–4.5)
PLATELET # BLD AUTO: 452 K/UL — HIGH (ref 150–400)
PMV BLD: 9.3 FL — SIGNIFICANT CHANGE UP (ref 7–13)
POTASSIUM SERPL-MCNC: 3.3 MMOL/L — LOW (ref 3.5–5.3)
POTASSIUM SERPL-SCNC: 3.3 MMOL/L — LOW (ref 3.5–5.3)
RBC # BLD: 3.45 M/UL — LOW (ref 3.8–5.2)
RBC # FLD: 13.8 % — SIGNIFICANT CHANGE UP (ref 10.3–14.5)
SODIUM SERPL-SCNC: 137 MMOL/L — SIGNIFICANT CHANGE UP (ref 135–145)
SPECIMEN SOURCE: SIGNIFICANT CHANGE UP
SPECIMEN SOURCE: SIGNIFICANT CHANGE UP
WBC # BLD: 19.03 K/UL — HIGH (ref 3.8–10.5)
WBC # FLD AUTO: 19.03 K/UL — HIGH (ref 3.8–10.5)

## 2020-11-26 RX ORDER — SODIUM,POTASSIUM PHOSPHATES 278-250MG
1 POWDER IN PACKET (EA) ORAL THREE TIMES A DAY
Refills: 0 | Status: COMPLETED | OUTPATIENT
Start: 2020-11-26 | End: 2020-11-27

## 2020-11-26 RX ORDER — MORPHINE SULFATE 50 MG/1
2 CAPSULE, EXTENDED RELEASE ORAL EVERY 4 HOURS
Refills: 0 | Status: DISCONTINUED | OUTPATIENT
Start: 2020-11-26 | End: 2020-11-27

## 2020-11-26 RX ORDER — SODIUM CHLORIDE 9 MG/ML
1000 INJECTION INTRAMUSCULAR; INTRAVENOUS; SUBCUTANEOUS
Refills: 0 | Status: DISCONTINUED | OUTPATIENT
Start: 2020-11-26 | End: 2020-11-26

## 2020-11-26 RX ORDER — POTASSIUM CHLORIDE 20 MEQ
40 PACKET (EA) ORAL EVERY 4 HOURS
Refills: 0 | Status: COMPLETED | OUTPATIENT
Start: 2020-11-26 | End: 2020-11-26

## 2020-11-26 RX ADMIN — MORPHINE SULFATE 2 MILLIGRAM(S): 50 CAPSULE, EXTENDED RELEASE ORAL at 20:01

## 2020-11-26 RX ADMIN — Medication 1 TABLET(S): at 21:28

## 2020-11-26 RX ADMIN — SODIUM CHLORIDE 100 MILLILITER(S): 9 INJECTION INTRAMUSCULAR; INTRAVENOUS; SUBCUTANEOUS at 05:15

## 2020-11-26 RX ADMIN — Medication 100 MILLIGRAM(S): at 06:18

## 2020-11-26 RX ADMIN — Medication 40 MILLIEQUIVALENT(S): at 17:10

## 2020-11-26 RX ADMIN — Medication 200 MILLIGRAM(S): at 05:15

## 2020-11-26 RX ADMIN — MORPHINE SULFATE 2 MILLIGRAM(S): 50 CAPSULE, EXTENDED RELEASE ORAL at 19:46

## 2020-11-26 RX ADMIN — Medication 40 MILLIEQUIVALENT(S): at 11:00

## 2020-11-26 RX ADMIN — SODIUM CHLORIDE 75 MILLILITER(S): 9 INJECTION INTRAMUSCULAR; INTRAVENOUS; SUBCUTANEOUS at 11:01

## 2020-11-26 RX ADMIN — Medication 1 TABLET(S): at 17:10

## 2020-11-26 NOTE — CONSULT NOTE ADULT - SUBJECTIVE AND OBJECTIVE BOX
Chief Complaint:  Patient is a 35y old  Female who presents with a chief complaint of diarrhea (26 Nov 2020 14:22)      HPI:    The patient is a 36 y/o F PMH endometriosis, thrombocytosis p/w stabbing, RLQ abdominal pain w/ radiation to R flank area since 11/21 w/ associated nausea, anorexia, decreased PO intake, and non-bloody diarrhea (>5 episodes per day). Reports pain is intermittent, severe, no alleviating/aggravating factors.  No recent antibiotic use, no change in medications, change in diet, travel, sick contacts.  Last colonoscopy >5 years ago as routine testing prior to endometriosis surgery.  The patient denies dysphagia,  unintentional weight loss, or NSAID use.  Her outpatient gastroenterologist is Dr. Quan.  On my interview this morning she reports decreased frequency of diarrhea/blood in the stool and improved abdominal pain.   Prior to this acute episode she was feeling well from a GI standpoint. She works in special education.      Allergies:  dairy products (Other)  No Known Drug Allergies  seasonal allergies; itchy eyes (Rhinitis)      Home Medications:    Hospital Medications:  morphine  - Injectable 2 milliGRAM(s) IV Push every 4 hours PRN  ondansetron Injectable 4 milliGRAM(s) IV Push every 6 hours PRN  potassium chloride    Tablet ER 40 milliEquivalent(s) Oral every 4 hours  potassium phosphate / sodium phosphate Tablet (K-PHOS No. 2) 1 Tablet(s) Oral three times a day  sodium chloride 0.9%. 1000 milliLiter(s) IV Continuous <Continuous>      PMHX/PSHX:  Seasonal Allergies    Low Muscle Tone    Migraine    Ovarian Cyst right    S/P Laparoscopic Ovarian Cystectomy - right        Family history:  No pertinent family history in first degree relatives        Social History:   Denies ethanol use.  Denies illicit drug use.    ROS:     General:  No wt loss, fevers, chills, night sweats, fatigue,   Eyes:  Good vision, no reported pain  ENT:  No sore throat, pain, runny nose, dysphagia  CV:  No pain, palpitations, hypo/hypertension  Resp:  No dyspnea, cough, tachypnea, wheezing  GI:  See HPI  :  No pain, bleeding, incontinence, nocturia  Muscle:  No pain, weakness  Neuro:  No weakness, tingling, memory problems  Psych:  No fatigue, insomnia, mood problems, depression  Endocrine:  No polyuria, polydipsia, cold/heat intolerance  Heme:  No petechiae, ecchymosis, easy bruisability  Integumentary:  No rash, edema      PHYSICAL EXAM:     GENERAL:  Appears stated age, well-groomed, well-nourished, no distress  HEENT:  NC/AT,  conjunctivae anicteric, clear and pink,   NECK: supple, trachea midline  CHEST:  Full & symmetric excursion, no increased effort, breath sounds clear  HEART:  Regular rhythm, no JVD  ABDOMEN:  Soft, non-tender, non-distended, normoactive bowel sounds,  no masses , no hepatosplenomegaly  EXTREMITIES:  no cyanosis,clubbing or edema  SKIN:  No rash, erythema, or, ecchymoses, no jaundice  NEURO:  Alert, non-focal, no asterixis  PSYCH: Appropriate affect, oriented to place and time  RECTAL: Deferred      Vital Signs:  Vital Signs Last 24 Hrs  T(C): 36.6 (26 Nov 2020 05:14), Max: 36.6 (25 Nov 2020 20:53)  T(F): 97.8 (26 Nov 2020 05:14), Max: 97.9 (25 Nov 2020 20:53)  HR: 79 (26 Nov 2020 05:14) (79 - 91)  BP: 106/63 (26 Nov 2020 05:14) (106/63 - 128/70)  BP(mean): --  RR: 16 (26 Nov 2020 05:14) (16 - 17)  SpO2: 100% (26 Nov 2020 05:14) (100% - 100%)  Daily     Daily     LABS:                        10.7   19.03 )-----------( 452      ( 26 Nov 2020 07:20 )             32.7     11-26    137  |  106  |  4<L>  ----------------------------<  77  3.3<L>   |  20<L>  |  0.49<L>    Ca    8.1<L>      26 Nov 2020 07:20  Phos  2.4     11-26  Mg     1.9     11-26    TPro  8.6<H>  /  Alb  5.0  /  TBili  0.5  /  DBili  x   /  AST  17  /  ALT  10  /  AlkPhos  73  11-24    LIVER FUNCTIONS - ( 24 Nov 2020 21:34 )  Alb: 5.0 g/dL / Pro: 8.6 g/dL / ALK PHOS: 73 u/L / ALT: 10 u/L / AST: 17 u/L / GGT: x             Urinalysis Basic - ( 24 Nov 2020 23:22 )    Color: LIGHT YELLOW / Appearance: CLEAR / SG: > 1.040 / pH: 6.5  Gluc: NEGATIVE / Ketone: MODERATE  / Bili: NEGATIVE / Urobili: NORMAL   Blood: LARGE / Protein: 10 / Nitrite: NEGATIVE   Leuk Esterase: NEGATIVE / RBC: 26-50 / WBC 3-5   Sq Epi: FEW / Non Sq Epi: x / Bacteria: FEW

## 2020-11-26 NOTE — CONSULT NOTE ADULT - PROBLEM SELECTOR RECOMMENDATION 9
Due to infection. No chronic symptoms to suggest underlyling IBD.  -monitor off of abx as giving antibiotics in this situation increases the risk of hemolytic uremic syndrome. Pt seems to be making a brisk clinical improvement.  -consider an outpatient colonoscopy in a few weeks to ensure resolution of colitis  -supportive care w IVF  -advance to low residue diet for lunch

## 2020-11-26 NOTE — DISCHARGE NOTE PROVIDER - NSDCCPCAREPLAN_GEN_ALL_CORE_FT
PRINCIPAL DISCHARGE DIAGNOSIS  Diagnosis: STEC (Shiga toxin-producing Escherichia coli)  Assessment and Plan of Treatment: You were found to have a stool infection. You were treated supportively and your symptoms improve. It is recommended that you follow up in 2 weeks for further management recommendations and colonoscopy.      SECONDARY DISCHARGE DIAGNOSES  Diagnosis: Endometriosis  Assessment and Plan of Treatment: Your tranvaginal ultrasound showed 1.2cm uterine fibroid w/ small bilateral ovarian cysts. Follow up with your outpatient GYN for further management recommendations.     PRINCIPAL DISCHARGE DIAGNOSIS  Diagnosis: STEC (Shiga toxin-producing Escherichia coli)  Assessment and Plan of Treatment: You were found to have a stool infection. You were treated supportively and your symptoms improve. It is recommended that you follow up in 2 weeks for further management recommendations and colonoscopy.      SECONDARY DISCHARGE DIAGNOSES  Diagnosis: Endometriosis  Assessment and Plan of Treatment: Your tranvaginal ultrasound showed 1.2cm uterine fibroid w/ small bilateral ovarian cysts. Follow up with your outpatient GYN for further management recommendations.    Diagnosis: Vaginal bleeding  Assessment and Plan of Treatment: Resolved  Follow up with outpaient GYN     PRINCIPAL DISCHARGE DIAGNOSIS  Diagnosis: STEC (Shiga toxin-producing Escherichia coli)  Assessment and Plan of Treatment: You were found to have a stool infection. You were treated supportively and your symptoms improve. It is recommended that you follow up in 2 weeks for further management recommendations and colonoscopy.      SECONDARY DISCHARGE DIAGNOSES  Diagnosis: Endometriosis  Assessment and Plan of Treatment: Your tranvaginal ultrasound showed 1.2cm uterine fibroid w/ small bilateral ovarian cysts. Follow up with your outpatient GYN for further management recommendations.  Continue pain medication as needed.    Diagnosis: Vaginal bleeding  Assessment and Plan of Treatment: Resolved  Follow up with outpaient GYN

## 2020-11-26 NOTE — CONSULT NOTE ADULT - ATTENDING COMMENTS
Differential diagnosis and plan of care discussed with patient after the evaluation  125 Minutes spent on total encounter of which more than fifty percent of the encounter was spent counseling and/or coordinating care by the attending physician.  Advanced care planning was discussed with the patient and/or surrogate decision makers. Advanced care planning forms were discussed. The risks benefits and alternatives to pertinent gastrointestinal procedures and interventions were discussed in detail and all questions were answered. Duration: 30 Minutes.      Thien Lincoln M.D.   Gastroenterology and Hepatology  Cell: [phone]

## 2020-11-26 NOTE — DISCHARGE NOTE PROVIDER - CARE PROVIDER_API CALL
Thien Lincoln)  Internal Medicine  [address] [place] [address]  Phone: [phone]  Fax: [phone]  Follow Up Time: 2 weeks    Dmitry York  INTERNAL MEDICINE  [address] [place] [address] [address]  [place] [address]  Phone: [phone]  Fax: [phone]  Established Patient  Follow Up Time: 1 week   Dmitry York  INTERNAL MEDICINE  [address] [facility] [address], [address]  [place] [address]  Phone: [phone]  Fax: [phone]  Established Patient  Follow Up Time: 1 week    Paulo Quan  GASTROENTEROLOGY  [address], [address]  [place], [place] [id number]  Phone: [phone]  Fax: [phone]  Established Patient  Follow Up Time: 2 weeks

## 2020-11-26 NOTE — PROGRESS NOTE ADULT - PROBLEM SELECTOR PLAN 3
leukocytosis, tachycardia, w/ evidence of colitis on CT,   antibiotics and studies as above  no further abx per GI.

## 2020-11-26 NOTE — DISCHARGE NOTE PROVIDER - NSDCMRMEDTOKEN_GEN_ALL_CORE_FT
Safyral oral tablet: 1 tab(s) orally once a day   Percocet 5 mg-325 mg oral tablet: 1 tab(s) orally every 6 hours, As Needed -for severe pain MDD:4 tabs   Safyral oral tablet: 1 tab(s) orally once a day

## 2020-11-26 NOTE — PROGRESS NOTE ADULT - SUBJECTIVE AND OBJECTIVE BOX
Patient is a 35y old  Female who presents with a chief complaint of diarrhea (25 Nov 2020 14:59)      SUBJECTIVE / OVERNIGHT EVENTS:  abd pain better  tolerating clear diet  stool cultures with Shiga producing E.coli  abx discontinued to avoid HUS  d/w the parents over the phone  no cp, no sob, no n/v/d. no headache, no dizziness.         Vital Signs Last 24 Hrs  T(C): 36.6 (26 Nov 2020 05:14), Max: 36.6 (25 Nov 2020 20:53)  T(F): 97.8 (26 Nov 2020 05:14), Max: 97.9 (25 Nov 2020 20:53)  HR: 79 (26 Nov 2020 05:14) (79 - 91)  BP: 106/63 (26 Nov 2020 05:14) (106/63 - 128/70)  BP(mean): --  RR: 16 (26 Nov 2020 05:14) (16 - 18)  SpO2: 100% (26 Nov 2020 05:14) (100% - 100%)  I&O's Summary      PHYSICAL EXAM:  GENERAL: NAD, Comfortable  HEAD:  Atraumatic, Normocephalic  EYES: EOMI, PERRLA, conjunctiva and sclera clear  NECK: Supple, No JVD  CHEST/LUNG: Clear to auscultation bilaterally; No wheeze  HEART: Regular rate and rhythm; No murmurs, rubs, or gallops  ABDOMEN: Soft, Nontender, Nondistended; Bowel sounds present  Neuro: AAO x 3, no focal deficit, 5/5 b/l extremities  EXTREMITIES:  2+ Peripheral Pulses, No clubbing, cyanosis, or edema  SKIN: No rashes or lesions    LABS:                        10.7   19.03 )-----------( 452      ( 26 Nov 2020 07:20 )             32.7     11-26    137  |  106  |  4<L>  ----------------------------<  77  3.3<L>   |  20<L>  |  0.49<L>    Ca    8.1<L>      26 Nov 2020 07:20  Phos  2.4     11-26  Mg     1.9     11-26    TPro  8.6<H>  /  Alb  5.0  /  TBili  0.5  /  DBili  x   /  AST  17  /  ALT  10  /  AlkPhos  73  11-24      CAPILLARY BLOOD GLUCOSE            Urinalysis Basic - ( 24 Nov 2020 23:22 )    Color: LIGHT YELLOW / Appearance: CLEAR / SG: > 1.040 / pH: 6.5  Gluc: NEGATIVE / Ketone: MODERATE  / Bili: NEGATIVE / Urobili: NORMAL   Blood: LARGE / Protein: 10 / Nitrite: NEGATIVE   Leuk Esterase: NEGATIVE / RBC: 26-50 / WBC 3-5   Sq Epi: FEW / Non Sq Epi: x / Bacteria: FEW        RADIOLOGY & ADDITIONAL TESTS:    Imaging Personally Reviewed:  [x] YES  [ ] NO    Consultant(s) Notes Reviewed:  [x] YES  [ ] NO      MEDICATIONS  (STANDING):  potassium chloride    Tablet ER 40 milliEquivalent(s) Oral every 4 hours  potassium phosphate / sodium phosphate Tablet (K-PHOS No. 2) 1 Tablet(s) Oral three times a day  sodium chloride 0.9%. 1000 milliLiter(s) (75 mL/Hr) IV Continuous <Continuous>    MEDICATIONS  (PRN):  morphine  - Injectable 2 milliGRAM(s) IV Push every 4 hours PRN mod-severe pain  ondansetron Injectable 4 milliGRAM(s) IV Push every 6 hours PRN Nausea and/or Vomiting      Care Discussed with Consultants/Other Providers [x] YES  [ ] NO

## 2020-11-26 NOTE — PROGRESS NOTE ADULT - ATTENDING COMMENTS
Likely dc planning late this evening vs. tomorrow based on her pain level and tolerance to advancing diet.     - Dr. RENÉ Pinedaet ([facility])  - [phone]

## 2020-11-26 NOTE — DISCHARGE NOTE PROVIDER - CARE PROVIDERS DIRECT ADDRESSES
,[email or web address]

## 2020-11-26 NOTE — DISCHARGE NOTE PROVIDER - HOSPITAL COURSE
35F PMH endometriosis, thrombocytosis p/w sepsis 2/2 colitis seen on CT a/p. Patient seen by GI and started on Cipro/Flagyl. Stool studies returned Shig-like toxin producing E coli and ABx stopped given known risk oh HUS with ABx treatment. Patient treated supportively with symptomatic improvement. Course complicated by vaginal bleed and was seen by GYN. TVUS w/ 1.2cm uterine fibroid, small B/L ovarian cysts and trace free fluid around adnexa. Per GYN, no inpatient management as bleeding lessened at time of discharge. On ___ this case was reviewed with Dr. Jimenez, the patient is medically stable and optimized for discharge. All medications were reviewed and prescriptions were sent to mutually agreed upon pharmacy. 35F PMH endometriosis, thrombocytosis p/w sepsis 2/2 colitis seen on CT a/p. Patient seen by GI and started on Cipro/Flagyl. Stool studies returned Shig-like toxin producing E coli and ABx stopped given known risk oh HUS with ABx treatment. Patient treated supportively with symptomatic improvement. Course complicated by vaginal bleed and was seen by GYN. TVUS w/ 1.2cm uterine fibroid, small B/L ovarian cysts and trace free fluid around adnexa. Per GYN, no inpatient management as bleeding lessened at time of discharge. On 11/28 this case was reviewed with Dr. Jimenez, the patient is medically stable and optimized for discharge. All medications were reviewed and prescriptions were sent to mutually agreed upon pharmacy. 35F PMH endometriosis, thrombocytosis p/w sepsis 2/2 colitis seen on CT a/p. Patient seen by GI and started on Cipro/Flagyl. Stool studies returned Shig-like toxin producing E coli and ABx stopped given known risk oh HUS with ABx treatment. Patient treated supportively with symptomatic improvement. Course complicated by vaginal bleed and was seen by GYN. TVUS w/ 1.2cm uterine fibroid, small B/L ovarian cysts and trace free fluid around adnexa. Per GYN, no inpatient management as bleeding lessened at time of discharge. On 11/28 this case was reviewed with Dr. Jimenez, the patient is medically stable and optimized for discharge. All medications were reviewed and prescriptions were sent to mutually agreed upon pharmacy.  ISTOP Reference #[id number]   35 F PMH endometriosis, thrombocytosis p/w sepsis 2/2 colitis seen on CT a/p. Patient seen by GI and started on Cipro/Flagyl. Stool studies returned Shig-like toxin producing E coli and ABx stopped given known risk oh HUS with ABx treatment. Patient treated supportively with symptomatic improvement. Course complicated by vaginal bleed and was seen by GYN. TVUS w/ 1.2cm uterine fibroid, small B/L ovarian cysts and trace free fluid around adnexa. Per GYN, no inpatient management as bleeding lessened at time of discharge. On 11/28 this case was reviewed with Dr. Jimenez, the patient is medically stable and optimized for discharge. All medications were reviewed and prescriptions were sent to mutually agreed upon pharmacy.  ISTOP Reference #[id number]   35 F PMH endometriosis, thrombocytosis p/w sepsis 2/2 colitis seen on CT a/p. Patient seen by GI and started on Cipro/Flagyl. Stool studies returned Shig-like toxin producing E coli and ABx stopped given known risk oh HUS with ABx treatment. Patient treated supportively with symptomatic improvement. Course complicated by vaginal bleed and was seen by GYN. TVUS w/ 1.2cm uterine fibroid, small B/L ovarian cysts and trace free fluid around adnexa. Per GYN, no inpatient management as bleeding lessened at time of discharge. On 11/28 this case was reviewed with Dr. Jimenez, the patient is medically stable and optimized for discharge. All medications were reviewed and prescriptions were sent to mutually agreed upon pharmacy.  ISTOP Reference #[id number]    Attending Addendum:  Patient seen and examined by me on the discharge day. Doing better.  Tolerating regular diet. Medications reviewed. d/w pt's parents per pt's request. Discharged home on low dose percocet PRN.  outpt follow up with PCP Dr. York and GI Dr. Quan in 1-2 weeks.   All questions answered in details. Follow up plan explained.  More than 30 mins were spent evaluating patient and coordinating care for discharge.  Discharge summary sent to pt's primary care physician at [facility].

## 2020-11-26 NOTE — PROGRESS NOTE ADULT - PROBLEM SELECTOR PLAN 2
reported starting after pelvic exam  LMP 3 weeks ago   Gyn consulted in ED, carola appreciated.  pt advised to follow up with her GYN outpt

## 2020-11-27 LAB
ANION GAP SERPL CALC-SCNC: 11 MMO/L — SIGNIFICANT CHANGE UP (ref 7–14)
BASOPHILS # BLD AUTO: 0.07 K/UL — SIGNIFICANT CHANGE UP (ref 0–0.2)
BASOPHILS NFR BLD AUTO: 0.4 % — SIGNIFICANT CHANGE UP (ref 0–2)
BLD GP AB SCN SERPL QL: NEGATIVE — SIGNIFICANT CHANGE UP
BUN SERPL-MCNC: 3 MG/DL — LOW (ref 7–23)
CALCIUM SERPL-MCNC: 8.9 MG/DL — SIGNIFICANT CHANGE UP (ref 8.4–10.5)
CHLORIDE SERPL-SCNC: 105 MMOL/L — SIGNIFICANT CHANGE UP (ref 98–107)
CO2 SERPL-SCNC: 22 MMOL/L — SIGNIFICANT CHANGE UP (ref 22–31)
CREAT SERPL-MCNC: 0.54 MG/DL — SIGNIFICANT CHANGE UP (ref 0.5–1.3)
CULTURE RESULTS: SIGNIFICANT CHANGE UP
EOSINOPHIL # BLD AUTO: 0.94 K/UL — HIGH (ref 0–0.5)
EOSINOPHIL NFR BLD AUTO: 5.8 % — SIGNIFICANT CHANGE UP (ref 0–6)
GLUCOSE SERPL-MCNC: 116 MG/DL — HIGH (ref 70–99)
HCT VFR BLD CALC: 35.2 % — SIGNIFICANT CHANGE UP (ref 34.5–45)
HGB BLD-MCNC: 11.6 G/DL — SIGNIFICANT CHANGE UP (ref 11.5–15.5)
IMM GRANULOCYTES NFR BLD AUTO: 0.4 % — SIGNIFICANT CHANGE UP (ref 0–1.5)
LYMPHOCYTES # BLD AUTO: 27.4 % — SIGNIFICANT CHANGE UP (ref 13–44)
LYMPHOCYTES # BLD AUTO: 4.47 K/UL — HIGH (ref 1–3.3)
MAGNESIUM SERPL-MCNC: 1.9 MG/DL — SIGNIFICANT CHANGE UP (ref 1.6–2.6)
MCHC RBC-ENTMCNC: 30.6 PG — SIGNIFICANT CHANGE UP (ref 27–34)
MCHC RBC-ENTMCNC: 33 % — SIGNIFICANT CHANGE UP (ref 32–36)
MCV RBC AUTO: 92.9 FL — SIGNIFICANT CHANGE UP (ref 80–100)
MONOCYTES # BLD AUTO: 1.35 K/UL — HIGH (ref 0–0.9)
MONOCYTES NFR BLD AUTO: 8.3 % — SIGNIFICANT CHANGE UP (ref 2–14)
NEUTROPHILS # BLD AUTO: 9.39 K/UL — HIGH (ref 1.8–7.4)
NEUTROPHILS NFR BLD AUTO: 57.7 % — SIGNIFICANT CHANGE UP (ref 43–77)
NRBC # FLD: 0 K/UL — SIGNIFICANT CHANGE UP (ref 0–0)
PHOSPHATE SERPL-MCNC: 2.5 MG/DL — SIGNIFICANT CHANGE UP (ref 2.5–4.5)
PLATELET # BLD AUTO: 580 K/UL — HIGH (ref 150–400)
PMV BLD: 8.9 FL — SIGNIFICANT CHANGE UP (ref 7–13)
POTASSIUM SERPL-MCNC: 3.7 MMOL/L — SIGNIFICANT CHANGE UP (ref 3.5–5.3)
POTASSIUM SERPL-SCNC: 3.7 MMOL/L — SIGNIFICANT CHANGE UP (ref 3.5–5.3)
RBC # BLD: 3.79 M/UL — LOW (ref 3.8–5.2)
RBC # FLD: 13.6 % — SIGNIFICANT CHANGE UP (ref 10.3–14.5)
RH IG SCN BLD-IMP: POSITIVE — SIGNIFICANT CHANGE UP
SODIUM SERPL-SCNC: 138 MMOL/L — SIGNIFICANT CHANGE UP (ref 135–145)
SPECIMEN SOURCE: SIGNIFICANT CHANGE UP
WBC # BLD: 16.29 K/UL — HIGH (ref 3.8–10.5)
WBC # FLD AUTO: 16.29 K/UL — HIGH (ref 3.8–10.5)

## 2020-11-27 RX ADMIN — Medication 1 TABLET(S): at 05:06

## 2020-11-27 NOTE — PROVIDER CONTACT NOTE (OTHER) - ASSESSMENT
patient on contact precautions for c-diff/rule out c-diff, active e-coli in stool order not accurate to diagnosis

## 2020-11-27 NOTE — PROGRESS NOTE ADULT - SUBJECTIVE AND OBJECTIVE BOX
Chief Complaint:  Patient is a 35y old  Female who presents with a chief complaint of diarrhea (26 Nov 2020 14:36)      Interval Events:   diarrhea last night and this am  overall feels improved    Allergies:  dairy products (Other)  No Known Drug Allergies  seasonal allergies; itchy eyes (Rhinitis)      Hospital Medications:  ondansetron Injectable 4 milliGRAM(s) IV Push every 6 hours PRN      PMHX/PSHX:  Seasonal Allergies    Low Muscle Tone    Migraine    Ovarian Cyst right    S/P Laparoscopic Ovarian Cystectomy - right        Family history:  No pertinent family history in first degree relatives        ROS:     General:  No wt loss, fevers, chills, night sweats, fatigue,   Eyes:  Good vision, no reported pain  ENT:  No sore throat, pain, runny nose, dysphagia  CV:  No pain, palpitations, hypo/hypertension  Resp:  No dyspnea, cough, tachypnea, wheezing  GI:  See HPI  :  No pain, bleeding, incontinence, nocturia  Muscle:  No pain, weakness  Neuro:  No weakness, tingling, memory problems  Psych:  No fatigue, insomnia, mood problems, depression  Endocrine:  No polyuria, polydipsia, cold/heat intolerance  Heme:  No petechiae, ecchymosis, easy bruisability  Skin:  No rash, edema      PHYSICAL EXAM:     GENERAL:  Appears stated age, well-groomed, well-nourished, no distress  HEENT:  NC/AT,  conjunctivae clear, sclera-anicteric  NECK: Trachea midline, supple  CHEST:  Full & symmetric excursion, no increased effort, breath sounds clear  HEART:  Regular rhythm, no margret/heave  ABDOMEN:  Soft, RLQ/suprapubic-tender, non-distended, normoactive bowel sounds,  no masses ,no hepato-splenomegaly,   EXTREMITIES:  no cyanosis,clubbing or edema  SKIN:  No rash/erythema/petechiae, no jaundice  NEURO:  Alert, oriented, no asterixis  RECTAL: Deferred    Vital Signs:  Vital Signs Last 24 Hrs  T(C): 37.1 (27 Nov 2020 13:25), Max: 37.1 (27 Nov 2020 13:25)  T(F): 98.7 (27 Nov 2020 13:25), Max: 98.7 (27 Nov 2020 13:25)  HR: 86 (27 Nov 2020 13:25) (86 - 93)  BP: 115/74 (27 Nov 2020 13:25) (115/74 - 119/69)  BP(mean): --  RR: 16 (27 Nov 2020 13:25) (16 - 17)  SpO2: 100% (27 Nov 2020 13:25) (98% - 100%)  Daily     Daily     LABS:                        11.6   16.29 )-----------( 580      ( 27 Nov 2020 05:50 )             35.2     11-27    138  |  105  |  3<L>  ----------------------------<  116<H>  3.7   |  22  |  0.54    Ca    8.9      27 Nov 2020 05:50  Phos  2.5     11-27  Mg     1.9     11-27                Imaging:

## 2020-11-27 NOTE — PROGRESS NOTE ADULT - SUBJECTIVE AND OBJECTIVE BOX
Patient is a 35y old  Female who presents with a chief complaint of diarrhea (27 Nov 2020 17:17)      SUBJECTIVE / OVERNIGHT EVENTS:  feeling better until she ate salad.  started to have abdominal pain. requesting to stay one more day.  overall improving.  tolerating diet  no cp, no sob, no n/v. stools not loose   no headache, no dizziness.         Vital Signs Last 24 Hrs  T(C): 36.8 (27 Nov 2020 21:21), Max: 37.1 (27 Nov 2020 13:25)  T(F): 98.3 (27 Nov 2020 21:21), Max: 98.7 (27 Nov 2020 13:25)  HR: 73 (27 Nov 2020 21:21) (73 - 86)  BP: 104/64 (27 Nov 2020 21:21) (104/64 - 115/77)  BP(mean): --  RR: 16 (27 Nov 2020 21:21) (16 - 17)  SpO2: 100% (27 Nov 2020 21:21) (99% - 100%)  I&O's Summary      PHYSICAL EXAM:  GENERAL: NAD, Comfortable  HEAD:  Atraumatic, Normocephalic  EYES: EOMI, PERRLA, conjunctiva and sclera clear  NECK: Supple, No JVD  CHEST/LUNG: Clear to auscultation bilaterally; No wheeze  HEART: Regular rate and rhythm; No murmurs, rubs, or gallops  ABDOMEN: Soft, Nontender, Nondistended; Bowel sounds present  Neuro: AAO x 3, no focal deficit, 5/5 b/l extremities  EXTREMITIES:  2+ Peripheral Pulses, No clubbing, cyanosis, or edema  SKIN: No rashes or lesions    LABS:                        11.6   16.29 )-----------( 580      ( 27 Nov 2020 05:50 )             35.2     11-27    138  |  105  |  3<L>  ----------------------------<  116<H>  3.7   |  22  |  0.54    Ca    8.9      27 Nov 2020 05:50  Phos  2.5     11-27  Mg     1.9     11-27        CAPILLARY BLOOD GLUCOSE                RADIOLOGY & ADDITIONAL TESTS:    Imaging Personally Reviewed:  [x] YES  [ ] NO    Consultant(s) Notes Reviewed:  [x] YES  [ ] NO      MEDICATIONS  (STANDING):    MEDICATIONS  (PRN):  ondansetron Injectable 4 milliGRAM(s) IV Push every 6 hours PRN Nausea and/or Vomiting      Care Discussed with Consultants/Other Providers [x] YES  [ ] NO

## 2020-11-27 NOTE — PROGRESS NOTE ADULT - ATTENDING COMMENTS
Likely dc planning late this evening vs. tomorrow based on her pain level and tolerance to advancing diet.     - Dr. RENÉ Pinedaet ([facility])  - [phone]

## 2020-11-27 NOTE — PROGRESS NOTE ADULT - ASSESSMENT
35 F w colitis secondary to shiga toxin producing E.coli       Problem/Recommendation - 1:  Problem: Colitis. Recommendation: Due to infection. Clinically improving.  -continue supportive care off of abx (due to risk of HUS)  -low residue diet  -possible d/c tomorrow if continues to improve     Problem/Recommendation - 2:  ·  Problem: Shiga toxin-producing Escherichia coli as cause of disease classified elsewhere.  Recommendation: As above, treat with supportive care but avoid abx.      Problem/Recommendation - 3:  ·  Problem: Leukocytosis.  Recommendation: reactive due to colitis. Improving.      Problem/Recommendation - 4:  ·  Problem: Thrombocytosis.  Recommendation: per hematology (follows as outpt)     Problem/Recommendation - 5:  ·  Problem: Endometriosis.  Recommendation: stable.      Problem/Recommendation - 6:  Problem: Lactose intolerance. Recommendation: lactose free diet.    Attending Attestation:   Differential diagnosis and plan of care discussed with patient after the evaluation  75 Minutes spent on total encounter of which more than fifty percent of the encounter was spent counseling and/or coordinating care by the attending physician.    Thien Lincoln M.D.   Gastroenterology and Hepatology  Cell: [phone] .     Yes

## 2020-11-27 NOTE — PROVIDER CONTACT NOTE (OTHER) - SITUATION
lab called for c-diff sample as today is last day to send sample, no order in place, do you want sample sent?

## 2020-11-27 NOTE — PROVIDER CONTACT NOTE (OTHER) - ASSESSMENT
lab called for c-diff sample as today is last day to send sample, no order in place, do you want sample sent? Patient active with e-coli, called because isolation precautions are still in place. does not look like sample for c-diff was sent

## 2020-11-28 ENCOUNTER — TRANSCRIPTION ENCOUNTER (OUTPATIENT)
Age: 35
End: 2020-11-28

## 2020-11-28 VITALS
SYSTOLIC BLOOD PRESSURE: 112 MMHG | TEMPERATURE: 98 F | DIASTOLIC BLOOD PRESSURE: 67 MMHG | RESPIRATION RATE: 17 BRPM | OXYGEN SATURATION: 99 % | HEART RATE: 81 BPM

## 2020-11-28 LAB
C TRACH RRNA SPEC QL NAA+PROBE: SIGNIFICANT CHANGE UP
N GONORRHOEA RRNA SPEC QL NAA+PROBE: SIGNIFICANT CHANGE UP

## 2020-11-28 RX ORDER — OXYCODONE AND ACETAMINOPHEN 5; 325 MG/1; MG/1
1 TABLET ORAL
Qty: 12 | Refills: 0
Start: 2020-11-28 | End: 2020-11-30

## 2020-11-28 NOTE — DISCHARGE NOTE NURSING/CASE MANAGEMENT/SOCIAL WORK - PATIENT PORTAL LINK FT
You can access the FollowMyHealth Patient Portal offered by [facility] by registering at the following website: [email or web address]. By joining [email or web address]’s FollowMyHealth portal, you will also be able to view your health information using other applications (apps) compatible with our system.

## 2020-11-30 LAB
CULTURE RESULTS: SIGNIFICANT CHANGE UP
SPECIMEN SOURCE: SIGNIFICANT CHANGE UP

## 2021-03-09 NOTE — H&P ADULT - NSHPSOURCEINFORD_GEN_ALL_CORE
Esha Marcos discharged to home accompanied by daughter.   Patient provided with the following educational materials upon discharge:  xarelto and post surgical information.   Valuables and belongings sent with patient.   discharge summary, discharge instructions, medications and follow up appointments reviewed with patient and daughter.  Patient and daughter verbalized understanding. IV and telemetry removed. All questions answered. Escorted via wheelchair to front entrance.    Patient/Chart(s)

## 2021-05-03 NOTE — DISCHARGE NOTE PROVIDER - PROVIDER TOKENS
Include Z78.9 (Other Specified Conditions Influencing Health Status) As An Associated Diagnosis?: Yes Add 52 Modifier (Optional): no Duration Of Freeze Thaw-Cycle (Seconds): 5-10 Detail Level: Detailed Consent: The patient's consent was obtained including but not limited to risks of crusting, scabbing, blistering, scarring, darker or lighter pigmentary change, recurrence, incomplete removal and infection. Medical Necessity Information: It is in your best interest to select a reason for this procedure from the list below. All of these items fulfill various CMS LCD requirements except the new and changing color options. Post-Care Instructions: I reviewed with the patient in detail post-care instructions. Patient is to wear sunprotection, and avoid picking at any of the treated lesions. Pt may apply Vaseline to crusted or scabbing areas. Number Of Freeze-Thaw Cycles: 1 freeze-thaw cycle PROVIDER:[TOKEN:[[id number]],FOLLOWUP:[2 weeks]],PROVIDER:[TOKEN:[[id number]],FOLLOWUP:[1 week],ESTABLISHEDPATIENT:[T]] PROVIDER:[TOKEN:[[id number]],FOLLOWUP:[1 week],ESTABLISHEDPATIENT:[T]],PROVIDER:[TOKEN:[[id number]],FOLLOWUP:[2 weeks],ESTABLISHEDPATIENT:[T]]

## 2023-08-18 ENCOUNTER — EMERGENCY (EMERGENCY)
Facility: HOSPITAL | Age: 38
LOS: 1 days | Discharge: HOME | End: 2023-08-18
Attending: STUDENT IN AN ORGANIZED HEALTH CARE EDUCATION/TRAINING PROGRAM | Admitting: STUDENT IN AN ORGANIZED HEALTH CARE EDUCATION/TRAINING PROGRAM
Payer: COMMERCIAL

## 2023-08-18 VITALS
OXYGEN SATURATION: 100 % | HEART RATE: 93 BPM | RESPIRATION RATE: 16 BRPM | SYSTOLIC BLOOD PRESSURE: 135 MMHG | DIASTOLIC BLOOD PRESSURE: 73 MMHG | TEMPERATURE: 98 F

## 2023-08-18 VITALS
TEMPERATURE: 98 F | DIASTOLIC BLOOD PRESSURE: 76 MMHG | HEART RATE: 74 BPM | SYSTOLIC BLOOD PRESSURE: 122 MMHG | OXYGEN SATURATION: 100 % | RESPIRATION RATE: 16 BRPM

## 2023-08-18 LAB
ALBUMIN SERPL ELPH-MCNC: 4.2 G/DL — SIGNIFICANT CHANGE UP (ref 3.3–5)
ALP SERPL-CCNC: 52 U/L — SIGNIFICANT CHANGE UP (ref 40–120)
ALT FLD-CCNC: 12 U/L — SIGNIFICANT CHANGE UP (ref 4–33)
ANION GAP SERPL CALC-SCNC: 13 MMOL/L — SIGNIFICANT CHANGE UP (ref 7–14)
APTT BLD: 29.6 SEC — SIGNIFICANT CHANGE UP (ref 24.5–35.6)
AST SERPL-CCNC: 30 U/L — SIGNIFICANT CHANGE UP (ref 4–32)
BASOPHILS # BLD AUTO: 0.09 K/UL — SIGNIFICANT CHANGE UP (ref 0–0.2)
BASOPHILS NFR BLD AUTO: 0.7 % — SIGNIFICANT CHANGE UP (ref 0–2)
BILIRUB SERPL-MCNC: 0.5 MG/DL — SIGNIFICANT CHANGE UP (ref 0.2–1.2)
BUN SERPL-MCNC: 11 MG/DL — SIGNIFICANT CHANGE UP (ref 7–23)
CALCIUM SERPL-MCNC: 9.9 MG/DL — SIGNIFICANT CHANGE UP (ref 8.4–10.5)
CHLORIDE SERPL-SCNC: 99 MMOL/L — SIGNIFICANT CHANGE UP (ref 98–107)
CO2 SERPL-SCNC: 22 MMOL/L — SIGNIFICANT CHANGE UP (ref 22–31)
CREAT SERPL-MCNC: 0.48 MG/DL — LOW (ref 0.5–1.3)
EGFR: 126 ML/MIN/1.73M2 — SIGNIFICANT CHANGE UP
EOSINOPHIL # BLD AUTO: 0.14 K/UL — SIGNIFICANT CHANGE UP (ref 0–0.5)
EOSINOPHIL NFR BLD AUTO: 1.1 % — SIGNIFICANT CHANGE UP (ref 0–6)
GLUCOSE SERPL-MCNC: 84 MG/DL — SIGNIFICANT CHANGE UP (ref 70–99)
HCG SERPL-ACNC: <1 MIU/ML — SIGNIFICANT CHANGE UP
HCT VFR BLD CALC: 44 % — SIGNIFICANT CHANGE UP (ref 34.5–45)
HGB BLD-MCNC: 14.5 G/DL — SIGNIFICANT CHANGE UP (ref 11.5–15.5)
IANC: 9.35 K/UL — HIGH (ref 1.8–7.4)
IMM GRANULOCYTES NFR BLD AUTO: 0.3 % — SIGNIFICANT CHANGE UP (ref 0–0.9)
INR BLD: 0.94 RATIO — SIGNIFICANT CHANGE UP (ref 0.85–1.18)
LYMPHOCYTES # BLD AUTO: 18.4 % — SIGNIFICANT CHANGE UP (ref 13–44)
LYMPHOCYTES # BLD AUTO: 2.36 K/UL — SIGNIFICANT CHANGE UP (ref 1–3.3)
MCHC RBC-ENTMCNC: 30.5 PG — SIGNIFICANT CHANGE UP (ref 27–34)
MCHC RBC-ENTMCNC: 33 GM/DL — SIGNIFICANT CHANGE UP (ref 32–36)
MCV RBC AUTO: 92.4 FL — SIGNIFICANT CHANGE UP (ref 80–100)
MONOCYTES # BLD AUTO: 0.88 K/UL — SIGNIFICANT CHANGE UP (ref 0–0.9)
MONOCYTES NFR BLD AUTO: 6.8 % — SIGNIFICANT CHANGE UP (ref 2–14)
NEUTROPHILS # BLD AUTO: 9.35 K/UL — HIGH (ref 1.8–7.4)
NEUTROPHILS NFR BLD AUTO: 72.7 % — SIGNIFICANT CHANGE UP (ref 43–77)
NRBC # BLD: 0 /100 WBCS — SIGNIFICANT CHANGE UP (ref 0–0)
NRBC # FLD: 0 K/UL — SIGNIFICANT CHANGE UP (ref 0–0)
PLATELET # BLD AUTO: 701 K/UL — HIGH (ref 150–400)
POTASSIUM SERPL-MCNC: 4.3 MMOL/L — SIGNIFICANT CHANGE UP (ref 3.5–5.3)
POTASSIUM SERPL-SCNC: 4.3 MMOL/L — SIGNIFICANT CHANGE UP (ref 3.5–5.3)
PROT SERPL-MCNC: 7.6 G/DL — SIGNIFICANT CHANGE UP (ref 6–8.3)
PROTHROM AB SERPL-ACNC: 10.6 SEC — SIGNIFICANT CHANGE UP (ref 9.5–13)
RBC # BLD: 4.76 M/UL — SIGNIFICANT CHANGE UP (ref 3.8–5.2)
RBC # FLD: 13.4 % — SIGNIFICANT CHANGE UP (ref 10.3–14.5)
SODIUM SERPL-SCNC: 134 MMOL/L — LOW (ref 135–145)
WBC # BLD: 12.86 K/UL — HIGH (ref 3.8–10.5)
WBC # FLD AUTO: 12.86 K/UL — HIGH (ref 3.8–10.5)

## 2023-08-18 PROCEDURE — 93010 ELECTROCARDIOGRAM REPORT: CPT

## 2023-08-18 PROCEDURE — 71046 X-RAY EXAM CHEST 2 VIEWS: CPT | Mod: 26

## 2023-08-18 PROCEDURE — 99285 EMERGENCY DEPT VISIT HI MDM: CPT

## 2023-08-18 NOTE — ED ADULT NURSE NOTE - NSFALLUNIVINTERV_ED_ALL_ED
Bed/Stretcher in lowest position, wheels locked, appropriate side rails in place/Call bell, personal items and telephone in reach/Instruct patient to call for assistance before getting out of bed/chair/stretcher/Non-slip footwear applied when patient is off stretcher/[place] to call system/Physically safe environment - no spills, clutter or unnecessary equipment/Purposeful proactive rounding/Room/bathroom lighting operational, light cord in reach

## 2023-08-18 NOTE — ED PROVIDER NOTE - OBJECTIVE STATEMENT
38 y/o F with pmhx of endometriosis, thrombocytosis presents to the ED c/o blood in sputum. Pt states she was about to brush her teeth and went to spit when she noted dark and bright red blood in the sputum. Pt estimates about half a cup of blood. Pt states she continued to spit and states it took 3 or 4 times before it started to clear up. Pt endorses a recent hx of nose bleeds in the past few weeks which she believes is related to dry conditions in her room. Denies fever/chills, CP, SOB/PETERS, night sweats, unexplained weight loss, new or worsening weakness or fatigue, recent sick contacts, leg swelling or cramping, recent travel or hospitalizations, family hx of bleeding disorders. Pt is following with a hematologist for thrombocytosis, stating that she has elevated WBC and platelet count with most recent platelet count 750. Pt also reports a family hx of CLL in father. Pt currently uses OCPs.

## 2023-08-18 NOTE — ED PROVIDER NOTE - CLINICAL SUMMARY MEDICAL DECISION MAKING FREE TEXT BOX
36 y/o F with pmhx of endometriosis, thrombocytosis presents to the ED c/o blood in sputum. Pt states she was about to brush her teeth and went to spit when she noted dark and bright red blood in the sputum. Pt estimates about half a cup of blood. Pt states she continued to spit and states it took 3 or 4 times before it started to clear up. Pt endorses a recent hx of nose bleeds in the past few weeks which she believes is related to dry conditions in her room. Denies fever/chills, CP, SOB/PETERS, night sweats, unexplained weight loss, new or worsening weakness or fatigue, recent sick contacts, leg swelling or cramping, recent travel or hospitalizations, family hx of bleeding disorders. Pt is following with a hematologist for thrombocytosis, stating that she has elevated WBC and platelet count with most recent platelet count 750. Pt also reports a family hx of CLL in father. Pt currently uses OCPs.    Patient currently afebrile, hemodynamically stable, spO2 100%. Physical exam with dried blood in L nare, otherwise unremarkable with orophaynx clear. Based on history and physical, differentials include but are not limited to sputum related to epistaxis vs PE vs bronchitis. Plan to assess patient for acute pathology as listed above with labs, CXR, d-dimer to r/o PE. If d-dimer elevated, will obtain CTA chest.

## 2023-08-18 NOTE — ED ADULT TRIAGE NOTE - CHIEF COMPLAINT QUOTE
pt ambulated into triage, C/O "spitting up blood," denies cough. denies chest pain, SOB, RR even and unlabored no s/s of respiratory distress noted. endorses recent nose bleeds, no bleeding noted at this time. hx of endometriosis surgeries.

## 2023-08-18 NOTE — ED PROVIDER NOTE - PHYSICAL EXAMINATION
General: Well appearing in no acute distress, alert and cooperative  Head: Normocephalic, atraumatic  Eyes: PERRLA, no conjunctival injection, no scleral icterus, EOMI  ENMT: Atraumatic external nose and ears, dry mucous membranes, oropharynx clear with no erythema or dried blood noted in posterior oropharynx  Neck: Soft and supple, full ROM without pain, no midline tenderness, no thyromegaly,  no lymphadenopathy  Cardiac: Regular rate and regular rhythm, no murmurs, peripheral pulses 2+ and symmetric in all extremities, no LE edema.  Resp: Unlabored respiratory effort, lungs CTAB, speaking in full sentences, no wheezes  Abd: Soft, non-tender, non-distended, no guarding or rebound tenderness  MSK: Spine midline and non-tender, no CVA tenderness   Skin: Warm and dry, no rashes/abrasions/lacerations  Neuro: AO x 3, moves all extremities symmetrically, Motor strength 5/5 bilaterally UE and LE, sensation grossly intact

## 2023-08-18 NOTE — ED ADULT NURSE NOTE - OBJECTIVE STATEMENT
pt is a pale female c/o 1 episode of bloody sputum today. pt denies chest pain, sob, PETERS, dizziness. pt states she has had nose bleeds over the past few days and seen hematologist for platelet disorder. #20 g piv placed in right wrist, labs sent. no med treatment at this time, pt pending imaging. family at bedside updated to POC

## 2023-08-18 NOTE — ED PROVIDER NOTE - ATTENDING APP SHARED VISIT CONTRIBUTION OF CARE
I, Tierra Hernández DO reviewed the ANGELICA plan of care and discussed the case with the ACP.      pt is well appearing. No petechia noted. No gum bleeding noted, Dried blood in LT nare. No blood in the throat. No resp distress. ddx include but not limited to epistaxis, gum bleeding. Low concern for PE. Eval for blood cell count abnormality given patients past history.

## 2023-08-18 NOTE — ED PROVIDER NOTE - PATIENT PORTAL LINK FT
You can access the FollowMyHealth Patient Portal offered by [facility] by registering at the following website: [email or web address]. By joining [employer]’s FollowMyHealth portal, you will also be able to view your health information using other applications (apps) compatible with our system.

## 2023-08-18 NOTE — ED PROVIDER NOTE - NSFOLLOWUPINSTRUCTIONS_ED_ALL_ED_FT
You were seen in the ED for bloody sputum.  A copy of the results from today's visit is provided.    Please follow-up with your hematologist.   Please continue to use the humidifier and spray nasal saline spray 2 sprays into each nostril four times a day.   Continue all previously prescribed medications as directed unless otherwise instructed.   Return to the ER for worsening or persistent symptoms, including but not limited to passing out, bleeding at back of throat, coughing up blood, lightheadedness, uncontrolled bleeding, cough, fever/chills, chest pain, shortness of breath.

## 2024-09-11 NOTE — ED ADULT NURSE NOTE - NSICDXFAMILYHX_GEN_ALL_CORE_FT
Cardiac clearance forms are being faxed concerning this procedure.    FAMILY HISTORY:  No pertinent family history in first degree relatives

## 2024-12-17 ENCOUNTER — APPOINTMENT (OUTPATIENT)
Dept: OTOLARYNGOLOGY | Facility: CLINIC | Age: 39
End: 2024-12-17
Payer: COMMERCIAL

## 2024-12-17 VITALS — BODY MASS INDEX: 22.08 KG/M2 | WEIGHT: 120 LBS | HEIGHT: 62 IN

## 2024-12-17 DIAGNOSIS — R04.0 EPISTAXIS: ICD-10-CM

## 2024-12-17 DIAGNOSIS — Z86.59 PERSONAL HISTORY OF OTHER MENTAL AND BEHAVIORAL DISORDERS: ICD-10-CM

## 2024-12-17 PROCEDURE — 99204 OFFICE O/P NEW MOD 45 MIN: CPT | Mod: 25

## 2024-12-17 PROCEDURE — 31238 NSL/SINS NDSC SRG NSL HEMRRG: CPT | Mod: 50

## 2024-12-17 PROCEDURE — 99203 OFFICE O/P NEW LOW 30 MIN: CPT | Mod: 25

## 2024-12-17 RX ORDER — TRAZODONE HYDROCHLORIDE 300 MG/1
TABLET ORAL
Refills: 0 | Status: ACTIVE | COMMUNITY

## 2024-12-20 ENCOUNTER — TRANSCRIPTION ENCOUNTER (OUTPATIENT)
Age: 39
End: 2024-12-20

## 2025-01-22 ENCOUNTER — APPOINTMENT (OUTPATIENT)
Dept: OTOLARYNGOLOGY | Facility: CLINIC | Age: 40
End: 2025-01-22
Payer: COMMERCIAL

## 2025-01-22 VITALS — BODY MASS INDEX: 22.15 KG/M2 | WEIGHT: 125 LBS | HEIGHT: 63 IN

## 2025-01-22 DIAGNOSIS — R04.0 EPISTAXIS: ICD-10-CM

## 2025-01-22 DIAGNOSIS — R09.89 OTHER SPECIFIED SYMPTOMS AND SIGNS INVOLVING THE CIRCULATORY AND RESPIRATORY SYSTEMS: ICD-10-CM

## 2025-01-22 PROCEDURE — 99214 OFFICE O/P EST MOD 30 MIN: CPT | Mod: 25

## 2025-01-22 PROCEDURE — 31238 NSL/SINS NDSC SRG NSL HEMRRG: CPT | Mod: LT

## 2025-01-29 ENCOUNTER — OUTPATIENT (OUTPATIENT)
Dept: OUTPATIENT SERVICES | Facility: HOSPITAL | Age: 40
LOS: 1 days | End: 2025-01-29
Payer: COMMERCIAL

## 2025-01-29 ENCOUNTER — APPOINTMENT (OUTPATIENT)
Dept: CT IMAGING | Facility: IMAGING CENTER | Age: 40
End: 2025-01-29
Payer: COMMERCIAL

## 2025-01-29 DIAGNOSIS — R09.89 OTHER SPECIFIED SYMPTOMS AND SIGNS INVOLVING THE CIRCULATORY AND RESPIRATORY SYSTEMS: ICD-10-CM

## 2025-01-29 PROCEDURE — 70486 CT MAXILLOFACIAL W/O DYE: CPT

## 2025-01-29 PROCEDURE — 70486 CT MAXILLOFACIAL W/O DYE: CPT | Mod: 26

## 2025-02-11 DIAGNOSIS — R09.89 OTHER SPECIFIED SYMPTOMS AND SIGNS INVOLVING THE CIRCULATORY AND RESPIRATORY SYSTEMS: ICD-10-CM

## 2025-02-11 DIAGNOSIS — R04.0 EPISTAXIS: ICD-10-CM

## 2025-02-18 ENCOUNTER — APPOINTMENT (OUTPATIENT)
Dept: OTOLARYNGOLOGY | Facility: CLINIC | Age: 40
End: 2025-02-18

## 2025-06-06 ENCOUNTER — TRANSCRIPTION ENCOUNTER (OUTPATIENT)
Age: 40
End: 2025-06-06

## 2025-06-24 ENCOUNTER — TRANSCRIPTION ENCOUNTER (OUTPATIENT)
Age: 40
End: 2025-06-24

## 2025-07-15 ENCOUNTER — TRANSCRIPTION ENCOUNTER (OUTPATIENT)
Age: 40
End: 2025-07-15

## 2025-07-17 ENCOUNTER — TRANSCRIPTION ENCOUNTER (OUTPATIENT)
Age: 40
End: 2025-07-17

## 2025-07-18 ENCOUNTER — TRANSCRIPTION ENCOUNTER (OUTPATIENT)
Age: 40
End: 2025-07-18

## 2025-07-30 ENCOUNTER — TRANSCRIPTION ENCOUNTER (OUTPATIENT)
Age: 40
End: 2025-07-30

## 2025-08-27 ENCOUNTER — TRANSCRIPTION ENCOUNTER (OUTPATIENT)
Age: 40
End: 2025-08-27